# Patient Record
Sex: FEMALE | Race: WHITE | NOT HISPANIC OR LATINO | Employment: FULL TIME | URBAN - METROPOLITAN AREA
[De-identification: names, ages, dates, MRNs, and addresses within clinical notes are randomized per-mention and may not be internally consistent; named-entity substitution may affect disease eponyms.]

---

## 2017-07-27 ENCOUNTER — OFFICE VISIT (OUTPATIENT)
Dept: FAMILY MEDICINE | Facility: CLINIC | Age: 37
End: 2017-07-27
Payer: COMMERCIAL

## 2017-07-27 VITALS
HEART RATE: 92 BPM | WEIGHT: 221 LBS | SYSTOLIC BLOOD PRESSURE: 98 MMHG | TEMPERATURE: 98.7 F | HEIGHT: 66 IN | BODY MASS INDEX: 35.52 KG/M2 | DIASTOLIC BLOOD PRESSURE: 78 MMHG

## 2017-07-27 DIAGNOSIS — B02.9 HERPES ZOSTER WITHOUT COMPLICATION: ICD-10-CM

## 2017-07-27 DIAGNOSIS — G51.0 BELL'S PALSY: Primary | ICD-10-CM

## 2017-07-27 PROCEDURE — 99213 OFFICE O/P EST LOW 20 MIN: CPT | Performed by: NURSE PRACTITIONER

## 2017-07-27 RX ORDER — PREDNISONE 10 MG/1
TABLET ORAL
COMMUNITY
Start: 2017-07-23 | End: 2017-12-19

## 2017-07-27 RX ORDER — GABAPENTIN 100 MG/1
CAPSULE ORAL
COMMUNITY
Start: 2017-07-09 | End: 2017-12-19

## 2017-07-27 RX ORDER — VALACYCLOVIR HYDROCHLORIDE 1 G/1
TABLET, FILM COATED ORAL
COMMUNITY
Start: 2017-07-23 | End: 2017-12-19

## 2017-07-27 RX ORDER — ESCITALOPRAM OXALATE 20 MG/1
30 TABLET ORAL DAILY
Refills: 3 | COMMUNITY
Start: 2017-05-02 | End: 2020-07-07

## 2017-07-27 NOTE — MR AVS SNAPSHOT
After Visit Summary   7/27/2017    Lauren Callaway    MRN: [id number]           Patient Information     Date Of Birth          1980        Visit Information        Provider Department      7/27/2017 6:20 PM Raquel Scanlon APRN CNP [facility]        Care Instructions    1.  Complete prednisone and antiviral therapy  2.  Recommend taping right eye closed as you can during day and at night.  Use lubricating eye drops.  3.  Would expect some improvement in paralysis within the first weeks.  You have moderate symptoms, good chance of recovery  4.  There is a 7% risk of reoccurrence, follow up immediately if symptoms reoccur in the future           Follow-ups after your visit        Who to contact     If you have questions or need follow up information about today's clinic visit or your schedule please contact [facility] directly at [phone].  Normal or non-critical lab and imaging results will be communicated to you by MyChart, letter or phone within 4 business days after the clinic has received the results. If you do not hear from us within 7 days, please contact the clinic through [employer] or phone. If you have a critical or abnormal lab result, we will notify you by phone as soon as possible.  Submit refill requests through [employer] or call your pharmacy and they will forward the refill request to us. Please allow 3 business days for your refill to be completed.          Additional Information About Your Visit        MyChart Information     ACSIAN gives you secure access to your electronic health record. If you see a primary care provider, you can also send messages to your care team and make appointments. If you have questions, please call your primary care clinic.  If you do not have a primary care provider, please call [phone] and they will assist you.        Care EveryWhere ID     This is your Care EveryWhere ID. This could be used by other  "organizations to access your [place] medical records  [id number]        Your Vitals Were     Pulse Temperature Height BMI (Body Mass Index)          92 98.7  F (37.1  C) (Oral) 5' 6\" (1.676 m) 35.67 kg/m2         Blood Pressure from Last 3 Encounters:   07/27/17 98/78   07/06/14 121/68   06/26/14 118/82    Weight from Last 3 Encounters:   07/27/17 221 lb (100.2 kg)   07/05/14 215 lb 11.2 oz (97.8 kg)   06/26/14 214 lb (97.1 kg)              Today, you had the following     No orders found for display       Primary Care Provider Office Phone # Fax #    Sonal ALEX Burns [phone]       [facility] [address] [place] [address]        Equal Access to Services     CURTIS REAGAN : Hadii aad ku hadasho Soomaali, waaxda luqadaha, qaybta kaalmada adeegyada, waxay idiin hayaan maico hahnarano lin . So [facility] [phone].    ATENCIÓN: Si habla español, tiene a fall disposición servicios gratuitos de asistencia lingüística. Barbaravanessa al [phone].    We comply with applicable federal civil rights laws and [place] laws. We do not discriminate on the basis of race, color, national origin, age, disability sex, sexual orientation or gender identity.            Thank you!     Thank you for choosing [facility]  for your care. Our goal is always to provide you with excellent care. Hearing back from our patients is one way we can continue to improve our services. Please take a few minutes to complete the written survey that you may receive in the mail after your visit with us. Thank you!             Your Updated Medication List - Protect others around you: Learn how to safely use, store and throw away your medicines at www.disposemymeds.org.          This list is accurate as of: 7/27/17  7:23 PM.  Always use your most recent med list.                   Brand Name Dispense Instructions for use Diagnosis    acetaminophen 325 MG tablet    TYLENOL    100 tablet    " Take 2 tablets (650 mg) by mouth once as needed for mild pain    Cholelithiasis with obstruction       escitalopram 20 MG tablet    LEXAPRO     Take 20 mg by mouth daily        gabapentin 100 MG capsule    NEURONTIN          IBUPROFEN PO      Take 400 mg by mouth every 6 hours as needed for moderate pain        predniSONE 10 MG tablet    DELTASONE          valACYclovir 1000 mg tablet    VALTREX

## 2017-07-27 NOTE — PROGRESS NOTES
SUBJECTIVE:                                                    Lauren Callaway is a 37 year old female who presents to clinic today for the following health issues:        Seen in ER 7/22 in [place] for symptoms of right facial paralysis.  Underwent CT scan which was normal, pt reports neg UPT and had a blood draw.  She was diagnosed with bells palsey.  Started on prednisone, and antiviral medication.  No improvement in facial paralysis.  She is using lubricating eyedrops, not taping eye closed.      She is having headaches, eye feels strained.  Has tried ibuprofen with improvement.  unable to watch TV or be on computer due to eye strain.  Works in office job.  Part time job, has been able to get trough.      On prednisone taper, 5 days lest, startred on 30mg dose.  11 day taper. 2 days left antiviral, 7d course    Shingles diagnosed earlier this month.  Was out of town, seen in  and started on antiviral and gabapentin.  Rash is still present, no pain, resolving.      Patient Active Problem List   Diagnosis     BMI 31     Cholelithiasis with obstruction     Choledocholithiasis     Past Surgical History:   Procedure Laterality Date     LAPAROSCOPIC CHOLECYSTECTOMY  7/5/2014    Procedure: LAPAROSCOPIC CHOLECYSTECTOMY;  Surgeon: Pako Sherman MD;  Location: [facility] OR     NO HISTORY OF SURGERY         Social History   Substance Use Topics     Smoking status: Former Smoker     Smokeless tobacco: Never Used      Comment: quit 3 years ago     Alcohol use Yes      Comment: social     Family History   Problem Relation Age of Onset     CANCER Mother      CANCER Maternal Grandfather      CANCER Paternal Grandfather      skin cancer         Current Outpatient Prescriptions   Medication Sig Dispense Refill     acetaminophen (TYLENOL) 325 MG tablet Take 2 tablets (650 mg) by mouth once as needed for mild pain 100 tablet 0     IBUPROFEN PO Take 400 mg by mouth every 6 hours as needed for moderate pain       escitalopram  "(LEXAPRO) 20 MG tablet Take 20 mg by mouth daily  3     predniSONE (DELTASONE) 10 MG tablet        valACYclovir (VALTREX) 1000 mg tablet        gabapentin (NEURONTIN) 100 MG capsule          ROS:  Neuro, Integ as above, otherwise negative       OBJECTIVE:                                                    BP 98/78  Pulse 92  Temp 98.7  F (37.1  C) (Oral)  Ht 5' 6\" (1.676 m)  Wt 221 lb (100.2 kg)  BMI 35.67 kg/m2   GENERAL APPEARANCE: healthy, alert and no distress  EYES: Eyes grossly normal to inspection, PERRL and conjunctivae and sclerae normal.  Is able to close right eye with effort  HENT: ear canals and TM's normal and nose and mouth without ulcers or lesions  SKIN: shingles lesions to left side, inframmamary area, and back have healed and are fading  NEURO: does have some movement of forehead and right face with expression, able to completely close right eye.  Normal tone at rest, no apparent facial droop.  PSYCH: mentation appears normal and affect normal/bright        ASSESSMENT/PLAN:                                                    (G51.0) Bell's palsy  (primary encounter diagnosis)  Comment: moderate severity on house-brackmann scale  Plan: complete prednisone taper and antiviral therapy.  Discussed 70% of patients have full recovery and higher recovery rate with less severe symptoms.  Reviewed good eye care including taping shut at night and frequent use lubricating eye drops.  discussed headache likely related to eye stain, reviewed frequent breaks and ibuprofen as needed.      (B02.9) Herpes zoster without complication  Comment: healing  Plan: discussed vaccination however likely not coered by her insurance         See Patient Instructions    Raquel Scanlon, CNP  [facility]    Patient Instructions   1.  Complete prednisone and antiviral therapy  2.  Recommend taping right eye closed as you can during day and at night.  Use lubricating eye drops.  3.  Would expect some improvement in " paralysis within the first weeks.  You have moderate symptoms, good chance of recovery  4.  There is a 7% risk of reoccurrence, follow up immediately if symptoms reoccur in the future

## 2017-07-27 NOTE — NURSING NOTE
"Chief Complaint   Patient presents with     Hospital F/U       Initial BP 98/78  Pulse 92  Temp 98.7  F (37.1  C) (Oral)  Ht 5' 6\" (1.676 m)  Wt 221 lb (100.2 kg)  BMI 35.67 kg/m2 Estimated body mass index is 35.67 kg/(m^2) as calculated from the following:    Height as of this encounter: 5' 6\" (1.676 m).    Weight as of this encounter: 221 lb (100.2 kg).  Medication Reconciliation: complete   Jace Hays CMA   "

## 2017-07-28 NOTE — PATIENT INSTRUCTIONS
1.  Complete prednisone and antiviral therapy  2.  Recommend taping right eye closed as you can during day and at night.  Use lubricating eye drops.  3.  Would expect some improvement in paralysis within the first weeks.  You have moderate symptoms, good chance of recovery  4.  There is a 7% risk of reoccurrence, follow up immediately if symptoms reoccur in the future

## 2017-09-17 ENCOUNTER — HEALTH MAINTENANCE LETTER (OUTPATIENT)
Age: 37
End: 2017-09-17

## 2017-12-19 ENCOUNTER — OFFICE VISIT (OUTPATIENT)
Dept: FAMILY MEDICINE | Facility: CLINIC | Age: 37
End: 2017-12-19
Payer: COMMERCIAL

## 2017-12-19 VITALS
SYSTOLIC BLOOD PRESSURE: 96 MMHG | RESPIRATION RATE: 24 BRPM | DIASTOLIC BLOOD PRESSURE: 64 MMHG | BODY MASS INDEX: 35.91 KG/M2 | OXYGEN SATURATION: 96 % | HEART RATE: 103 BPM | WEIGHT: 222.5 LBS | TEMPERATURE: 99 F

## 2017-12-19 DIAGNOSIS — R07.0 THROAT PAIN: ICD-10-CM

## 2017-12-19 DIAGNOSIS — J11.1 INFLUENZA: Primary | ICD-10-CM

## 2017-12-19 LAB
DEPRECATED S PYO AG THROAT QL EIA: NORMAL
SPECIMEN SOURCE: NORMAL

## 2017-12-19 PROCEDURE — 87081 CULTURE SCREEN ONLY: CPT | Performed by: PHYSICIAN ASSISTANT

## 2017-12-19 PROCEDURE — 87880 STREP A ASSAY W/OPTIC: CPT | Performed by: PHYSICIAN ASSISTANT

## 2017-12-19 PROCEDURE — 99213 OFFICE O/P EST LOW 20 MIN: CPT | Performed by: PHYSICIAN ASSISTANT

## 2017-12-19 RX ORDER — OSELTAMIVIR PHOSPHATE 75 MG/1
75 CAPSULE ORAL 2 TIMES DAILY
Qty: 10 CAPSULE | Refills: 0 | Status: SHIPPED | OUTPATIENT
Start: 2017-12-19 | End: 2018-02-12

## 2017-12-19 ASSESSMENT — ENCOUNTER SYMPTOMS
SORE THROAT: 1
VOMITING: 0
NAUSEA: 1
SHORTNESS OF BREATH: 0
FOCAL WEAKNESS: 0
SINUS PAIN: 1
DIARRHEA: 0
MYALGIAS: 1
COUGH: 0
HEADACHES: 1
ABDOMINAL PAIN: 0
CHILLS: 1
FEVER: 1

## 2017-12-19 NOTE — PROGRESS NOTES
"  SUBJECTIVE:   Lauren Callaway is a 37 year old female who presents to clinic today for the following health issues:      RESPIRATORY SYMPTOMS      Duration: few dysa    Description  rhinorrhea, sore throat, facial pain/pressure, cough, fever, chills, headache, fatigue/malaise and nausea    Severity: moderate    Accompanying signs and symptoms: None    History (predisposing factors):  none    Precipitating or alleviating factors: None    Therapies tried and outcome:  rest and fluids        {additional problems for provider to add:387404}    Problem list and histories reviewed & adjusted, as indicated.  Additional history: {NONE - AS DOCUMENTED:943909::\"as documented\"}    {HIST REVIEW/ LINKS 2:835840}    Reviewed and updated as needed this visit by clinical staff     Reviewed and updated as needed this visit by Provider         {PROVIDER CHARTING PREFERENCE:273528}    "

## 2017-12-19 NOTE — PROGRESS NOTES
HPI    SUBJECTIVE:   Lauren Callaway is a 37 year old female who presents to clinic today for the following health issues:      RESPIRATORY SYMPTOMS      Duration: 2 days    Description  rhinorrhea, sore throat, facial pain/pressure, fever, chills, headache, myalgias, fatigue/malaise and nausea    Severity: moderate    Accompanying signs and symptoms: None    History (predisposing factors):  none    Precipitating or alleviating factors: None    Therapies tried and outcome:  rest and fluids    Unknown Tmax. Denies urinary sx, vomiting, diarrhea, abd pain, CP, or SOB.      Chart Review:  PHQ-9 SCORE 2/17/2014 6/16/2014   Total Score 7 3     No flowsheet data found.    Patient Active Problem List   Diagnosis     BMI 31     Cholelithiasis with obstruction     Choledocholithiasis     Past Surgical History:   Procedure Laterality Date     LAPAROSCOPIC CHOLECYSTECTOMY  7/5/2014    Procedure: LAPAROSCOPIC CHOLECYSTECTOMY;  Surgeon: Pako Sherman MD;  Location:  OR     NO HISTORY OF SURGERY       Family History   Problem Relation Age of Onset     CANCER Mother      CANCER Maternal Grandfather      CANCER Paternal Grandfather      skin cancer      Social History   Substance Use Topics     Smoking status: Former Smoker     Smokeless tobacco: Never Used      Comment: quit 3 years ago     Alcohol use Yes      Comment: social        Problem list, Medication list, Allergies, Medical/Social/Surg hx reviewed in [employer] updated as appropriate.      Review of Systems   Constitutional: Positive for chills, fever and malaise/fatigue.   HENT: Positive for sinus pain and sore throat.    Respiratory: Negative for cough and shortness of breath.    Cardiovascular: Negative for chest pain.   Gastrointestinal: Positive for nausea. Negative for abdominal pain, diarrhea and vomiting.   Musculoskeletal: Positive for myalgias.   Skin: Negative for rash.   Neurological: Positive for headaches. Negative for focal weakness.   All other systems  reviewed and are negative.        Physical Exam   Constitutional: She is oriented to person, place, and time and well-developed, well-nourished, and in no distress.   HENT:   Head: Normocephalic and atraumatic.   Right Ear: Tympanic membrane, external ear and ear canal normal.   Left Ear: Tympanic membrane, external ear and ear canal normal.   Nose: Mucosal edema present. No sinus tenderness.   Mouth/Throat: Uvula is midline and mucous membranes are normal. Posterior oropharyngeal erythema present. No oropharyngeal exudate, posterior oropharyngeal edema or tonsillar abscesses.   Cardiovascular: Normal rate, regular rhythm and normal heart sounds.    Pulmonary/Chest: Effort normal and breath sounds normal.   Musculoskeletal: Normal range of motion.   Neurological: She is alert and oriented to person, place, and time. Gait normal.   Skin: Skin is warm and dry.   Nursing note and vitals reviewed.    Vital Signs  BP 96/64  Pulse 103  Temp 99  F (37.2  C) (Oral)  Resp 24  Wt 222 lb 8 oz (100.9 kg)  SpO2 96%  BMI 35.91 kg/m2   Body mass index is 35.91 kg/(m^2).    Diagnostic Test Results:  Results for orders placed or performed in visit on 12/19/17 (from the past 24 hour(s))   Strep, Rapid Screen   Result Value Ref Range    Specimen Description Throat     Rapid Strep A Screen       NEGATIVE: No Group A streptococcal antigen detected by immunoassay, await culture report.       ASSESSMENT/PLAN:                                                        ICD-10-CM    1. Influenza J11.1 oseltamivir (TAMIFLU) 75 MG capsule   2. Throat pain R07.0 Strep, Rapid Screen     Beta strep group A culture   Strep negative, Lungs CTAB, Clinical influenza diagnosis. Rx Tamiflu, recommend rest/fluids/ibuprofen or Tylenol.    I have discussed any lab or imaging results, the patient's diagnosis, and my plan of treatment with the patient and/or family. Patient is aware to come back in if with worsening symptoms or if no relief despite  treatment plan.  Patient voiced understanding and had no further questions.       Follow Up: Return if symptoms worsen or fail to improve.    ILANA Freeman, PA-C  [facility]

## 2017-12-19 NOTE — MR AVS SNAPSHOT
After Visit Summary   12/19/2017    Lauren Callaway    MRN: [id number]           Patient Information     Date Of Birth          1980        Visit Information        Provider Department      12/19/2017 8:00 AM Sydnie Buchanan PA-C [facility][note 3]        Today's Diagnoses     Influenza    -  1    Throat pain           Follow-ups after your visit        Follow-up notes from your care team     Return if symptoms worsen or fail to improve.      Who to contact     If you have questions or need follow up information about today's clinic visit or your schedule please contact [facility] directly at [phone].  Normal or non-critical lab and imaging results will be communicated to you by MyChart, letter or phone within 4 business days after the clinic has received the results. If you do not hear from us within 7 days, please contact the clinic through [employer] or phone. If you have a critical or abnormal lab result, we will notify you by phone as soon as possible.  Submit refill requests through [employer] or call your pharmacy and they will forward the refill request to us. Please allow 3 business days for your refill to be completed.          Additional Information About Your Visit        MyChart Information     [employer] gives you secure access to your electronic health record. If you see a primary care provider, you can also send messages to your care team and make appointments. If you have questions, please call your primary care clinic.  If you do not have a primary care provider, please call [phone] and they will assist you.        Care EveryWhere ID     This is your Care EveryWhere ID. This could be used by other organizations to access your [place] medical records  [id number]        Your Vitals Were     Pulse Temperature Respirations Pulse Oximetry BMI (Body Mass Index)       103 99  F (37.2  C) (Oral) 24 96% 35.91 kg/m2        Blood Pressure from Last 3  Encounters:   12/19/17 96/64   07/27/17 98/78   07/06/14 121/68    Weight from Last 3 Encounters:   12/19/17 222 lb 8 oz (100.9 kg)   07/27/17 221 lb (100.2 kg)   07/05/14 215 lb 11.2 oz (97.8 kg)              We Performed the Following     Beta strep group A culture     Strep, Rapid Screen          Today's Medication Changes          These changes are accurate as of: 12/19/17  8:51 AM.  If you have any questions, ask your nurse or doctor.               Start taking these medicines.        Dose/Directions    oseltamivir 75 MG capsule   Commonly known as:  TAMIFLU   Used for:  Influenza   Started by:  Sydnie Buchanan PA-C        Dose:  75 mg   Take 1 capsule (75 mg) by mouth 2 times daily   Quantity:  10 capsule   Refills:  0            Where to get your medicines      These medications were sent to [facility] [address]  [address] [facility] [address]     Phone:  [phone]     oseltamivir 75 MG capsule                Primary Care Provider Office Phone # Fax #    Sonal Newberry, ALEX [facility] [phone] [address] [place] [address]        Equal Access to Services     CURTIS REAGAN AH: Hadii aad ku hadasho Soomaali, waaxda luqadaha, qaybta kaalmada adeegyada, vangie madrigal. So [facility] [phone].    ATENCIÓN: Si habla español, tiene a flal disposición servicios gratuitos de asistencia lingüística. Llame al [phone].    We comply with applicable federal civil rights laws and [place] laws. We do not discriminate on the basis of race, color, national origin, age, disability, sex, sexual orientation, or gender identity.            Thank you!     Thank you for choosing [facility]  for your care. Our goal is always to provide you with excellent care. Hearing back from our patients is one way we can continue to improve our services. Please take a few minutes to complete the written  survey that you may receive in the mail after your visit with us. Thank you!             Your Updated Medication List - Protect others around you: Learn how to safely use, store and throw away your medicines at www.disposemymeds.org.          This list is accurate as of: 12/19/17  8:51 AM.  Always use your most recent med list.                   Brand Name Dispense Instructions for use Diagnosis    acetaminophen 325 MG tablet    TYLENOL    100 tablet    Take 2 tablets (650 mg) by mouth once as needed for mild pain    Cholelithiasis with obstruction       escitalopram 20 MG tablet    LEXAPRO     Take 20 mg by mouth daily        IBUPROFEN PO      Take 400 mg by mouth every 6 hours as needed for moderate pain        oseltamivir 75 MG capsule    TAMIFLU    10 capsule    Take 1 capsule (75 mg) by mouth 2 times daily    Influenza

## 2017-12-20 LAB
BACTERIA SPEC CULT: NORMAL
SPECIMEN SOURCE: NORMAL

## 2018-02-12 ENCOUNTER — OFFICE VISIT (OUTPATIENT)
Dept: FAMILY MEDICINE | Facility: CLINIC | Age: 38
End: 2018-02-12
Payer: COMMERCIAL

## 2018-02-12 ENCOUNTER — TELEPHONE (OUTPATIENT)
Dept: FAMILY MEDICINE | Facility: CLINIC | Age: 38
End: 2018-02-12

## 2018-02-12 VITALS
TEMPERATURE: 98.4 F | OXYGEN SATURATION: 98 % | SYSTOLIC BLOOD PRESSURE: 108 MMHG | DIASTOLIC BLOOD PRESSURE: 73 MMHG | WEIGHT: 221 LBS | HEART RATE: 91 BPM | BODY MASS INDEX: 35.67 KG/M2 | RESPIRATION RATE: 18 BRPM

## 2018-02-12 DIAGNOSIS — Z01.84 IMMUNITY STATUS TESTING: ICD-10-CM

## 2018-02-12 DIAGNOSIS — Z11.1 SCREENING EXAMINATION FOR PULMONARY TUBERCULOSIS: ICD-10-CM

## 2018-02-12 DIAGNOSIS — J01.90 ACUTE SINUSITIS WITH SYMPTOMS > 10 DAYS: Primary | ICD-10-CM

## 2018-02-12 DIAGNOSIS — Z23 NEED FOR HEPATITIS B VACCINATION: ICD-10-CM

## 2018-02-12 LAB
MEV IGG SER QL IA: 1.2 AI (ref 0–0.8)
MUV IGG SER QL IA: 1.2 AI (ref 0–0.8)
RUBV IGG SERPL IA-ACNC: 11 IU/ML
VZV IGG SER QL IA: 7.6 AI (ref 0–0.8)

## 2018-02-12 PROCEDURE — 86762 RUBELLA ANTIBODY: CPT | Performed by: PHYSICIAN ASSISTANT

## 2018-02-12 PROCEDURE — 36415 COLL VENOUS BLD VENIPUNCTURE: CPT | Performed by: PHYSICIAN ASSISTANT

## 2018-02-12 PROCEDURE — 86480 TB TEST CELL IMMUN MEASURE: CPT | Performed by: PHYSICIAN ASSISTANT

## 2018-02-12 PROCEDURE — 90746 HEPB VACCINE 3 DOSE ADULT IM: CPT | Performed by: PHYSICIAN ASSISTANT

## 2018-02-12 PROCEDURE — 86735 MUMPS ANTIBODY: CPT | Performed by: PHYSICIAN ASSISTANT

## 2018-02-12 PROCEDURE — 99213 OFFICE O/P EST LOW 20 MIN: CPT | Mod: 25 | Performed by: PHYSICIAN ASSISTANT

## 2018-02-12 PROCEDURE — 86765 RUBEOLA ANTIBODY: CPT | Performed by: PHYSICIAN ASSISTANT

## 2018-02-12 PROCEDURE — 86787 VARICELLA-ZOSTER ANTIBODY: CPT | Performed by: PHYSICIAN ASSISTANT

## 2018-02-12 PROCEDURE — 90471 IMMUNIZATION ADMIN: CPT | Performed by: PHYSICIAN ASSISTANT

## 2018-02-12 RX ORDER — FLUTICASONE PROPIONATE 50 MCG
2 SPRAY, SUSPENSION (ML) NASAL DAILY
Qty: 16 G | Refills: 3 | Status: SHIPPED | OUTPATIENT
Start: 2018-02-12 | End: 2018-02-16

## 2018-02-12 NOTE — TELEPHONE ENCOUNTER
Reason for Call:  Medication or medication refill:    Do you use a [facility]?  Name of the pharmacy and phone number for the current request:  [id number] [place] [facility] [place], [place] - [address] [place] [address]    Name of the medication requested: flonase, augmentin    Other request: patient was seen today and the medication that was sent to her pharmacy patient states that the pharmacy is faxing over something for provider and that they will not fill prescription until they get that back    Can we leave a detailed message on this number? YES    Phone number patient can be reached at: Home number on file [phone] (home)    Best Time:     Call taken on 2/12/2018 at 11:21 AM by Alicia Davila

## 2018-02-12 NOTE — NURSING NOTE
Screening Questionnaire for Adult Immunization     Are you sick today?   Yes    Do you have allergies to medications, food or any vaccine?   No    Have you ever had a serious reaction after receiving a vaccination?   No    Do you have a long-term health problem with heart disease, lung disease,  asthma, kidney disease, diabetes, anemia, metabolic or blood disease?   No    Do you have cancer, leukemia, AIDS, or any immune system problem?   No    Do you take cortisone, prednisone, other steroids, or anticancer drugs, or  have you had any x-ray (radiation) treatments?   No    Have you had a seizure, brain, or other nervous system problem?   No    During the past year, have you received a transfusion of blood or blood       products, or been given a medicine called immune (gamma) globulin?   No    For women: Are you pregnant or is there a chance you could become         pregnant during the next month?   No    Have you received any vaccinations in the past 4 weeks?   No     Immunization questionnaire was positive for at least one answer.  Notified Plosser.      MNVFC doesn't apply on this patient      Per orders of Plosser, injection of Hep B given by Josefa Norman. Patient instructed to remain in clinic for 20 minutes afterwards, and to report any adverse reaction to me immediately.    Prior to injection verified patient identity using patient's name and date of birth.         Screening performed by Josefa Norman, CMA

## 2018-02-12 NOTE — PROGRESS NOTES
SUBJECTIVE:   Lauren Callaway is a 38 year old female who presents to clinic today for the following health issues:    RESPIRATORY SYMPTOMS      Duration: 2 weeks    Description  nasal congestion, sore throat, facial pain/pressure, both ears feel plugges and headache    Severity: moderate    Accompanying signs and symptoms: red nose (possible staph infection)    History (predisposing factors):  none    Therapies tried and outcome:  OTC cold medication, ibuprofen (lasyt dose last night)-helps a little     Fever last week for a few days, low grade.      Other:  Titer and immunization for school - see orders      Problem list and histories reviewed & adjusted, as indicated.  Additional history: as documented    Patient Active Problem List   Diagnosis     BMI 31     Cholelithiasis with obstruction     Choledocholithiasis     Past Surgical History:   Procedure Laterality Date     LAPAROSCOPIC CHOLECYSTECTOMY  7/5/2014    Procedure: LAPAROSCOPIC CHOLECYSTECTOMY;  Surgeon: Pako Sherman MD;  Location:  OR     NO HISTORY OF SURGERY         Social History   Substance Use Topics     Smoking status: Former Smoker     Smokeless tobacco: Never Used      Comment: quit 3 years ago     Alcohol use Yes      Comment: social     Family History   Problem Relation Age of Onset     CANCER Mother      CANCER Maternal Grandfather      CANCER Paternal Grandfather      skin cancer         Current Outpatient Prescriptions   Medication Sig Dispense Refill     BusPIRone HCl (BUSPAR PO) Take 15 mg by mouth daily       Amphetamine-Dextroamphetamine (AMPHETAMINE SALT COMBO PO)        amoxicillin-clavulanate (AUGMENTIN) 875-125 MG per tablet Take 1 tablet by mouth 2 times daily 20 tablet 0     fluticasone (FLONASE) 50 MCG/ACT spray Spray 2 sprays into both nostrils daily 16 g 3     escitalopram (LEXAPRO) 20 MG tablet Take 30 mg by mouth daily   3     IBUPROFEN PO Take 400 mg by mouth every 6 hours as needed for moderate pain        Allergies   Allergen Reactions     Nkda [No Known Drug Allergies]        Reviewed and updated as needed this visit by clinical staff  Tobacco  Allergies  Meds  Problems  Med Hx  Surg Hx  Fam Hx  Soc Hx        Reviewed and updated as needed this visit by Provider  Allergies  Meds  Problems         ROS:  Constitutional, HEENT, cardiovascular, pulmonary, GI, , musculoskeletal, neuro, skin, endocrine and psych systems are negative, except as otherwise noted.    OBJECTIVE:     /73 (BP Location: Left arm, Patient Position: Sitting, Cuff Size: Adult Large)  Pulse 91  Temp 98.4  F (36.9  C) (Oral)  Resp 18  Wt 221 lb (100.2 kg)  SpO2 98%  BMI 35.67 kg/m2  Body mass index is 35.67 kg/(m^2).  GENERAL: healthy, alert and no distress  EYES: Eyes grossly normal to inspection, PERRL and conjunctivae and sclerae normal  HENT: ear canals and TM's normal (but with increase clear mucous behind bilateral TMs, no erythema or bulging though), nose with increased erythema and some clear fluid externally; and mouth without ulcers or lesions; mild tenderness to palpation of sinuses.   NECK: no adenopathy, no asymmetry, masses, or scars and thyroid normal to palpation  RESP: lungs clear to auscultation - no rales, rhonchi or wheezes  CV: regular rate and rhythm, normal S1 S2, no S3 or S4, no murmur, click or rub, no peripheral edema and peripheral pulses strong  PSYCH: mentation appears normal, affect normal/bright    Diagnostic Test Results:  none     ASSESSMENT/PLAN:       ICD-10-CM    1. Acute sinusitis with symptoms > 10 days J01.90 amoxicillin-clavulanate (AUGMENTIN) 875-125 MG per tablet     fluticasone (FLONASE) 50 MCG/ACT spray   2. Immunity status testing Z01.84 Rubella Antibody IgG Quantitative     Rubeola Antibody IgG     Mumps Antibody IgG     Hepatitis B Surface Antibody     Varicella Zoster Virus Antibody IgG   3. Need for hepatitis B vaccination Z23 HEPATITIS B VACCINE, ADULT, IM     ADMIN 1st  VACCINE   4. Screening examination for pulmonary tuberculosis Z11.1 M Tuberculosis by [facility]       Patient Instructions   Encouraged mucinex/guafenisin, warm salt water gargles, cepacol spray, soothers/lozenges, sinus rinses (neilmed), flonase (2 sprays per nostril daily x 2 weeks), vitamin c, fluids and rest.  May alternate tylenol and NSAIDS (ibuprofen, advil, aleve type products) every 4-6 hours for the next few days as needed.    Prescription for augmentin (antibiotic) sent to pharmacy.  Return to clinic with any worsening or changes in symptoms.       Carey Davis PA-C  [facility]

## 2018-02-12 NOTE — MR AVS SNAPSHOT
After Visit Summary   2/12/2018    Lauren Callaway    MRN: [id number]           Patient Information     Date Of Birth          1980        Visit Information        Provider Department      2/12/2018 10:20 AM Carey Davis PA-C [facility]        Today's Diagnoses     Acute sinusitis with symptoms > 10 days    -  1    Immunity status testing        Need for hepatitis B vaccination        Screening examination for pulmonary tuberculosis          Care Instructions    Encouraged mucinex/guafenisin, warm salt water gargles, cepacol spray, soothers/lozenges, sinus rinses (neilmed), flonase (2 sprays per nostril daily x 2 weeks), vitamin c, fluids and rest.  May alternate tylenol and NSAIDS (ibuprofen, advil, aleve type products) every 4-6 hours for the next few days as needed.    Prescription for augmentin (antibiotic) sent to pharmacy.  Return to clinic with any worsening or changes in symptoms.           Follow-ups after your visit        Follow-up notes from your care team     Return if symptoms worsen or fail to improve.      Future tests that were ordered for you today     Open Future Orders        Priority Expected Expires Ordered    Hepatitis B Surface Antibody Routine  2/12/2019 2/12/2018            Who to contact     If you have questions or need follow up information about today's clinic visit or your schedule please contact [facility] directly at [phone].  Normal or non-critical lab and imaging results will be communicated to you by MyChart, letter or phone within 4 business days after the clinic has received the results. If you do not hear from us within 7 days, please contact the clinic through MyChart or phone. If you have a critical or abnormal lab result, we will notify you by phone as soon as possible.  Submit refill requests through [employer] or call your pharmacy and they will forward the refill request to us. Please allow 3 business days for  your refill to be completed.          Additional Information About Your Visit        Go [employer] Information     [employer] gives you secure access to your electronic health record. If you see a primary care provider, you can also send messages to your care team and make appointments. If you have questions, please call your primary care clinic.  If you do not have a primary care provider, please call [phone] and they will assist you.        Care EveryWhere ID     This is your Care EveryWhere ID. This could be used by other organizations to access your [facility] medical records  [id number]        Your Vitals Were     Pulse Temperature Respirations Pulse Oximetry BMI (Body Mass Index)       91 98.4  F (36.9  C) (Oral) 18 98% 35.67 kg/m2        Blood Pressure from Last 3 Encounters:   02/12/18 108/73   12/19/17 96/64   07/27/17 98/78    Weight from Last 3 Encounters:   02/12/18 221 lb (100.2 kg)   12/19/17 222 lb 8 oz (100.9 kg)   07/27/17 221 lb (100.2 kg)              We Performed the Following     HEPATITIS B VACCINE, ADULT, IM     M Tuberculosis by Quantiferon     Mumps Antibody IgG     Rubella Antibody IgG Quantitative     Rubeola Antibody IgG     Varicella Zoster Virus Antibody IgG          Today's Medication Changes          These changes are accurate as of 2/12/18 10:36 AM.  If you have any questions, ask your nurse or doctor.               Start taking these medicines.        Dose/Directions    amoxicillin-clavulanate 875-125 MG per tablet   Commonly known as:  AUGMENTIN   Used for:  Acute sinusitis with symptoms > 10 days   Started by:  Carey Davis PA-C        Dose:  1 tablet   Take 1 tablet by mouth 2 times daily   Quantity:  20 tablet   Refills:  0       fluticasone 50 MCG/ACT spray   Commonly known as:  FLONASE   Used for:  Acute sinusitis with symptoms > 10 days   Started by:  Carey Davis PA-C        Dose:  2 spray   Spray 2 sprays into both nostrils daily   Quantity:  16 g    Refills:  3         Stop taking these medicines if you haven't already. Please contact your care team if you have questions.     acetaminophen 325 MG tablet   Commonly known as:  TYLENOL   Stopped by:  Carey Davis PA-C           oseltamivir 75 MG capsule   Commonly known as:  TAMIFLU   Stopped by:  Carey Davis PA-C                Where to get your medicines      These medications were sent to [facility] [address] [place] [facility] [address]  [address] [address]     Phone:  [phone]     amoxicillin-clavulanate 875-125 MG per tablet    fluticasone 50 MCG/ACT spray                Primary Care Provider Office Phone # Fax #    Sonal BRAGG ALEX [facility] [phone] [address] [place] [address]        Equal Access to Services     CURTIS REAGAN : Hadii jayme caseyo Sobal, waaxda luqadaha, qaybta kaalmada adenateyafélix, vangie lin . So [facility] [phone].    ATENCIÓN: Si habla español, tiene a fall disposición servicios gratuitos de asistencia lingüística. Chey al [phone].    We comply with applicable federal civil rights laws and [place] laws. We do not discriminate on the basis of race, color, national origin, age, disability, sex, sexual orientation, or gender identity.            Thank you!     Thank you for choosing [facility] your [facility]. Our goal is always to provide you with excellent care. Hearing back from our patients is one way we can continue to improve our services. Please take a few minutes to complete the written survey that you may receive in the mail after your visit with us. Thank you!             Your Updated Medication List - Protect others around you: Learn how to safely use, store and throw away your medicines at www.disposemymeds.org.          This list is accurate as of 2/12/18 10:36 AM.  Always use your most recent med list.                    Brand Name Dispense Instructions for use Diagnosis    amoxicillin-clavulanate 875-125 MG per tablet    AUGMENTIN    20 tablet    Take 1 tablet by mouth 2 times daily    Acute sinusitis with symptoms > 10 days       AMPHETAMINE SALT COMBO PO           BUSPAR PO      Take 15 mg by mouth daily        escitalopram 20 MG tablet    LEXAPRO     Take 30 mg by mouth daily        fluticasone 50 MCG/ACT spray    FLONASE    16 g    Spray 2 sprays into both nostrils daily    Acute sinusitis with symptoms > 10 days       IBUPROFEN PO      Take 400 mg by mouth every 6 hours as needed for moderate pain

## 2018-02-12 NOTE — PATIENT INSTRUCTIONS
Encouraged mucinex/guafenisin, warm salt water gargles, cepacol spray, soothers/lozenges, sinus rinses (neilmed), flonase (2 sprays per nostril daily x 2 weeks), vitamin c, fluids and rest.  May alternate tylenol and NSAIDS (ibuprofen, advil, aleve type products) every 4-6 hours for the next few days as needed.    Prescription for augmentin (antibiotic) sent to pharmacy.  Return to clinic with any worsening or changes in symptoms.

## 2018-02-13 DIAGNOSIS — J01.90 ACUTE SINUSITIS WITH SYMPTOMS > 10 DAYS: ICD-10-CM

## 2018-02-13 LAB
M TB TUBERC IFN-G BLD QL: NEGATIVE
M TB TUBERC IFN-G/MITOGEN IGNF BLD: 0.14 IU/ML

## 2018-02-13 NOTE — TELEPHONE ENCOUNTER
Alternative requested: drug not covered, formulary meds are: Mometasone flunisolide or triamcinlone

## 2018-02-13 NOTE — TELEPHONE ENCOUNTER
"Requested Prescriptions   Pending Prescriptions Disp Refills     fluticasone (FLONASE) 50 MCG/ACT spray  Last Written Prescription Date:  2/12/2018  Last Fill Quantity: 16g,  # refills: 3   Last Office Visit: 2/12/2018   Future Office Visit:      16 g 3     Sig: Spray 2 sprays into both nostrils daily    Inhaled Steroids Protocol Failed    2/13/2018 10:37 AM       Failed - Recent or future visit with authorizing provider's specialty    Patient had office visit in the last year or has a visit in the next 30 days with authorizing provider.  See \"Patient Info\" tab in inbasket, or \"Choose Columns\" in Meds & Orders section of the refill encounter.          Passed - Patient is age 12 or older          "

## 2018-02-14 RX ORDER — FLUTICASONE PROPIONATE 50 MCG
2 SPRAY, SUSPENSION (ML) NASAL DAILY
Qty: 0.01 G | Refills: 0 | OUTPATIENT
Start: 2018-02-14

## 2018-02-14 NOTE — TELEPHONE ENCOUNTER
Duplicate    Refused Prescriptions:                       Disp   Refills    fluticasone (FLONASE) 50 MCG/ACT spray     0.01 g 0        Sig: Spray 2 sprays into both nostrils daily  Refused By: MARY LUTZ  Reason for Refusal: Duplicate      Closing encounter - no further actions needed at this time    Mary Lutz RN

## 2018-02-15 NOTE — PROGRESS NOTES
"Annamarie Aguilar  Your attached labs suggest immunity to MMR and chickenpox.  Your TB testing is negative as well.    Please contact the office with any questions or concerns.    Moody,  Carey \"Freddie\" VAISHALI Davis  "

## 2018-02-16 DIAGNOSIS — J01.90 ACUTE SINUSITIS WITH SYMPTOMS > 10 DAYS: ICD-10-CM

## 2018-02-16 NOTE — TELEPHONE ENCOUNTER
"Requested Prescriptions   Pending Prescriptions Disp Refills     fluticasone (FLONASE) 50 MCG/ACT spray  PHARMACY REQUESTING ALTERNATIVE BECAUSE NOT COVERED.  Last Written Prescription Date:  2/12/18  Last Fill Quantity: 16 G,  # refills: 3   Last office visit: 2/12/2018 with prescribing provider:  2/12/18   Future Office Visit:     16 g 3     Sig: Spray 2 sprays into both nostrils daily    Inhaled Steroids Protocol Failed    2/16/2018  4:20 PM       Failed - Recent or future visit with authorizing provider's specialty    Patient had office visit in the last year or has a visit in the next 30 days with authorizing provider.  See \"Patient Info\" tab in inbasket, or \"Choose Columns\" in Meds & Orders section of the refill encounter.            Passed - Patient is age 12 or older        PHARMACY LISTS FORMULARY MEDS: MOMETASONE, FLUNISOLIDE, OR TRIAMCINOLONE.  "

## 2018-02-19 RX ORDER — FLUTICASONE PROPIONATE 50 MCG
2 SPRAY, SUSPENSION (ML) NASAL DAILY
Qty: 16 G | Refills: 3 | Status: SHIPPED | OUTPATIENT
Start: 2018-02-19 | End: 2021-04-13

## 2018-02-19 NOTE — TELEPHONE ENCOUNTER
Routing refill request to provider for review/approval because:  Diagnosis not on the [facility] refill protocol Acute sinusitis with symptoms > 10 days [J01.90]  - Primary       Thank you,  Venkata Tran RN

## 2019-02-22 ENCOUNTER — MEDICAL CORRESPONDENCE (OUTPATIENT)
Dept: HEALTH INFORMATION MANAGEMENT | Facility: CLINIC | Age: 39
End: 2019-02-22

## 2019-03-14 ENCOUNTER — TELEPHONE (OUTPATIENT)
Dept: FAMILY MEDICINE | Facility: CLINIC | Age: 39
End: 2019-03-14

## 2019-03-14 NOTE — TELEPHONE ENCOUNTER
Patient has lab only appt here TOMORROW (Friday 3/15), no orders in chart.  Please place FUTURE orders in Epic if appropriate.    Thanks,   Robina vidales

## 2019-03-14 NOTE — TELEPHONE ENCOUNTER
Please call Lauren.  I do not know why she is coming in for lab only or what labs she is looking for.

## 2019-03-14 NOTE — TELEPHONE ENCOUNTER
Phone call to patient     Patient states she has a written outside order for multiple labs to be drawn     Ordering provider is from [facility] - Toshia Dietrich PAC -     Patient will bring written order with to the appointment     Tessa Kelly, Registered Nurse   [facility]

## 2019-03-15 DIAGNOSIS — Z79.899 NEED FOR PROPHYLACTIC CHEMOTHERAPY: Primary | ICD-10-CM

## 2019-03-15 DIAGNOSIS — R53.83 FATIGUE: ICD-10-CM

## 2019-03-15 LAB
ALT SERPL W P-5'-P-CCNC: 40 U/L (ref 0–50)
ANION GAP SERPL CALCULATED.3IONS-SCNC: 8 MMOL/L (ref 3–14)
AST SERPL W P-5'-P-CCNC: 24 U/L (ref 0–45)
BASOPHILS # BLD AUTO: 0 10E9/L (ref 0–0.2)
BASOPHILS NFR BLD AUTO: 0 %
BUN SERPL-MCNC: 15 MG/DL (ref 7–30)
CALCIUM SERPL-MCNC: 8.9 MG/DL (ref 8.5–10.1)
CHLORIDE SERPL-SCNC: 107 MMOL/L (ref 94–109)
CHOLEST SERPL-MCNC: 179 MG/DL
CO2 SERPL-SCNC: 24 MMOL/L (ref 20–32)
CREAT SERPL-MCNC: 0.73 MG/DL (ref 0.52–1.04)
DIFFERENTIAL METHOD BLD: NORMAL
EOSINOPHIL # BLD AUTO: 0 10E9/L (ref 0–0.7)
EOSINOPHIL NFR BLD AUTO: 0 %
ERYTHROCYTE [DISTWIDTH] IN BLOOD BY AUTOMATED COUNT: 12.3 % (ref 10–15)
FERRITIN SERPL-MCNC: 109 NG/ML (ref 12–150)
GFR SERPL CREATININE-BSD FRML MDRD: >90 ML/MIN/{1.73_M2}
GLUCOSE SERPL-MCNC: 90 MG/DL (ref 70–99)
GLUCOSE SERPL-MCNC: 93 MG/DL (ref 70–99)
HBA1C MFR BLD: 5.2 % (ref 0–5.6)
HCT VFR BLD AUTO: 40 % (ref 35–47)
HDLC SERPL-MCNC: 40 MG/DL
HGB BLD-MCNC: 13.4 G/DL (ref 11.7–15.7)
IRON SERPL-MCNC: 77 UG/DL (ref 35–180)
LDLC SERPL CALC-MCNC: 118 MG/DL
LYMPHOCYTES # BLD AUTO: 2.1 10E9/L (ref 0.8–5.3)
LYMPHOCYTES NFR BLD AUTO: 28.5 %
MCH RBC QN AUTO: 29 PG (ref 26.5–33)
MCHC RBC AUTO-ENTMCNC: 33.5 G/DL (ref 31.5–36.5)
MCV RBC AUTO: 87 FL (ref 78–100)
MONOCYTES # BLD AUTO: 0.4 10E9/L (ref 0–1.3)
MONOCYTES NFR BLD AUTO: 5.3 %
NEUTROPHILS # BLD AUTO: 4.9 10E9/L (ref 1.6–8.3)
NEUTROPHILS NFR BLD AUTO: 66.2 %
NONHDLC SERPL-MCNC: 139 MG/DL
PLATELET # BLD AUTO: 248 10E9/L (ref 150–450)
POTASSIUM SERPL-SCNC: 4.1 MMOL/L (ref 3.4–5.3)
RBC # BLD AUTO: 4.62 10E12/L (ref 3.8–5.2)
SODIUM SERPL-SCNC: 139 MMOL/L (ref 133–144)
T3 SERPL-MCNC: 100 NG/DL (ref 60–181)
T3FREE SERPL-MCNC: 2.4 PG/ML (ref 2.3–4.2)
T4 FREE SERPL-MCNC: 1 NG/DL (ref 0.76–1.46)
T4 SERPL-MCNC: 9.3 UG/DL (ref 4.5–13.9)
TRIGL SERPL-MCNC: 104 MG/DL
TSH SERPL DL<=0.005 MIU/L-ACNC: 2.9 MU/L (ref 0.4–4)
VIT B12 SERPL-MCNC: 678 PG/ML (ref 193–986)
WBC # BLD AUTO: 7.4 10E9/L (ref 4–11)

## 2019-03-15 PROCEDURE — 84460 ALANINE AMINO (ALT) (SGPT): CPT | Performed by: FAMILY MEDICINE

## 2019-03-15 PROCEDURE — 84436 ASSAY OF TOTAL THYROXINE: CPT | Performed by: FAMILY MEDICINE

## 2019-03-15 PROCEDURE — 83090 ASSAY OF HOMOCYSTEINE: CPT | Performed by: FAMILY MEDICINE

## 2019-03-15 PROCEDURE — 82728 ASSAY OF FERRITIN: CPT | Performed by: FAMILY MEDICINE

## 2019-03-15 PROCEDURE — 84443 ASSAY THYROID STIM HORMONE: CPT | Performed by: FAMILY MEDICINE

## 2019-03-15 PROCEDURE — 82947 ASSAY GLUCOSE BLOOD QUANT: CPT | Mod: 59 | Performed by: FAMILY MEDICINE

## 2019-03-15 PROCEDURE — 80048 BASIC METABOLIC PNL TOTAL CA: CPT | Performed by: FAMILY MEDICINE

## 2019-03-15 PROCEDURE — 99000 SPECIMEN HANDLING OFFICE-LAB: CPT | Performed by: FAMILY MEDICINE

## 2019-03-15 PROCEDURE — 83921 ORGANIC ACID SINGLE QUANT: CPT | Mod: 90 | Performed by: FAMILY MEDICINE

## 2019-03-15 PROCEDURE — 36415 COLL VENOUS BLD VENIPUNCTURE: CPT | Performed by: FAMILY MEDICINE

## 2019-03-15 PROCEDURE — 83540 ASSAY OF IRON: CPT | Performed by: FAMILY MEDICINE

## 2019-03-15 PROCEDURE — 80061 LIPID PANEL: CPT | Performed by: FAMILY MEDICINE

## 2019-03-15 PROCEDURE — 82306 VITAMIN D 25 HYDROXY: CPT | Performed by: FAMILY MEDICINE

## 2019-03-15 PROCEDURE — 83036 HEMOGLOBIN GLYCOSYLATED A1C: CPT | Performed by: FAMILY MEDICINE

## 2019-03-15 PROCEDURE — 84450 TRANSFERASE (AST) (SGOT): CPT | Performed by: FAMILY MEDICINE

## 2019-03-15 PROCEDURE — 82607 VITAMIN B-12: CPT | Performed by: FAMILY MEDICINE

## 2019-03-15 PROCEDURE — 84480 ASSAY TRIIODOTHYRONINE (T3): CPT | Performed by: FAMILY MEDICINE

## 2019-03-15 PROCEDURE — 86376 MICROSOMAL ANTIBODY EACH: CPT | Performed by: FAMILY MEDICINE

## 2019-03-15 PROCEDURE — 85025 COMPLETE CBC W/AUTO DIFF WBC: CPT | Performed by: FAMILY MEDICINE

## 2019-03-17 LAB — METHYLMALONATE SERPL-SCNC: 0.11 UMOL/L (ref 0–0.4)

## 2019-03-18 LAB — THYROPEROXIDASE AB SERPL-ACNC: 13 IU/ML

## 2019-03-20 LAB
DEPRECATED CALCIDIOL+CALCIFEROL SERPL-MC: <72 UG/L (ref 20–75)
HCYS SERPL-SCNC: 7.7 UMOL/L (ref 4–12)
VITAMIN D2 SERPL-MCNC: <5 UG/L
VITAMIN D3 SERPL-MCNC: 67 UG/L

## 2019-11-08 ENCOUNTER — HEALTH MAINTENANCE LETTER (OUTPATIENT)
Age: 39
End: 2019-11-08

## 2020-02-23 ENCOUNTER — HEALTH MAINTENANCE LETTER (OUTPATIENT)
Age: 40
End: 2020-02-23

## 2020-07-07 ENCOUNTER — OFFICE VISIT (OUTPATIENT)
Dept: FAMILY MEDICINE | Facility: CLINIC | Age: 40
End: 2020-07-07
Payer: COMMERCIAL

## 2020-07-07 VITALS
RESPIRATION RATE: 16 BRPM | HEIGHT: 66 IN | DIASTOLIC BLOOD PRESSURE: 80 MMHG | TEMPERATURE: 97.1 F | HEART RATE: 93 BPM | OXYGEN SATURATION: 96 % | SYSTOLIC BLOOD PRESSURE: 126 MMHG | BODY MASS INDEX: 36.32 KG/M2 | WEIGHT: 226 LBS

## 2020-07-07 DIAGNOSIS — Z12.4 SCREENING FOR CERVICAL CANCER: ICD-10-CM

## 2020-07-07 DIAGNOSIS — Z13.1 SCREENING FOR DIABETES MELLITUS: ICD-10-CM

## 2020-07-07 DIAGNOSIS — Z13.220 SCREENING FOR HYPERLIPIDEMIA: ICD-10-CM

## 2020-07-07 DIAGNOSIS — E66.01 MORBID OBESITY (H): ICD-10-CM

## 2020-07-07 DIAGNOSIS — Z00.00 ROUTINE GENERAL MEDICAL EXAMINATION AT A HEALTH CARE FACILITY: Primary | ICD-10-CM

## 2020-07-07 DIAGNOSIS — Z13.0 SCREENING FOR IRON DEFICIENCY ANEMIA: ICD-10-CM

## 2020-07-07 DIAGNOSIS — Z97.5 IUD (INTRAUTERINE DEVICE) IN PLACE: ICD-10-CM

## 2020-07-07 LAB
CHOLEST SERPL-MCNC: 197 MG/DL
GLUCOSE SERPL-MCNC: 98 MG/DL (ref 70–99)
HDLC SERPL-MCNC: 37 MG/DL
HGB BLD-MCNC: 13.8 G/DL (ref 11.7–15.7)
LDLC SERPL CALC-MCNC: 116 MG/DL
NONHDLC SERPL-MCNC: 160 MG/DL
TRIGL SERPL-MCNC: 219 MG/DL

## 2020-07-07 PROCEDURE — 82947 ASSAY GLUCOSE BLOOD QUANT: CPT | Performed by: NURSE PRACTITIONER

## 2020-07-07 PROCEDURE — 36415 COLL VENOUS BLD VENIPUNCTURE: CPT | Performed by: NURSE PRACTITIONER

## 2020-07-07 PROCEDURE — G0145 SCR C/V CYTO,THINLAYER,RESCR: HCPCS | Performed by: NURSE PRACTITIONER

## 2020-07-07 PROCEDURE — 85018 HEMOGLOBIN: CPT | Performed by: NURSE PRACTITIONER

## 2020-07-07 PROCEDURE — 87624 HPV HI-RISK TYP POOLED RSLT: CPT | Performed by: NURSE PRACTITIONER

## 2020-07-07 PROCEDURE — 99396 PREV VISIT EST AGE 40-64: CPT | Performed by: NURSE PRACTITIONER

## 2020-07-07 PROCEDURE — 80061 LIPID PANEL: CPT | Performed by: NURSE PRACTITIONER

## 2020-07-07 RX ORDER — RISPERIDONE 1 MG/1
1 TABLET ORAL 2 TIMES DAILY
COMMUNITY
Start: 2020-07-07

## 2020-07-07 RX ORDER — VENLAFAXINE HYDROCHLORIDE 150 MG/1
CAPSULE, EXTENDED RELEASE ORAL
COMMUNITY
Start: 2020-05-27

## 2020-07-07 RX ORDER — LAMOTRIGINE 150 MG/1
TABLET ORAL
COMMUNITY
Start: 2020-05-27

## 2020-07-07 ASSESSMENT — PATIENT HEALTH QUESTIONNAIRE - PHQ9: SUM OF ALL RESPONSES TO PHQ QUESTIONS 1-9: 18

## 2020-07-07 ASSESSMENT — MIFFLIN-ST. JEOR: SCORE: 1711.88

## 2020-07-07 NOTE — PROGRESS NOTES
SUBJECTIVE:   CC: Lauren Callaway is an 40 year old woman who presents for preventive health visit.     Healthy Habits:    Do you get at least three servings of calcium containing foods daily (dairy, green leafy vegetables, etc.)? yes    Amount of exercise or daily activities, outside of work: 1 hour(s) per day    Problems taking medications regularly No    Medication side effects: No    Have you had an eye exam in the past two years? yes    Do you see a dentist twice per year? yes    Do you have sleep apnea, excessive snoring or daytime drowsiness?no        Today's PHQ-2 Score:   PHQ-2 ( 1999 Pf[employer]) 7/7/2020 2/12/2018   Q1: Little interest or pleasure in doing things 1 0   Q2: Feeling down, depressed or hopeless 3 0   PHQ-2 Score 4 0       Abuse: Current or Past(Physical, Sexual or Emotional)- No  Do you feel safe in your environment? Yes    Social History     Tobacco Use     Smoking status: Former Smoker     Smokeless tobacco: Never Used     Tobacco comment: quit 3 years ago   Substance Use Topics     Alcohol use: Yes     Comment: social     If you drink alcohol do you typically have >3 drinks per day or >7 drinks per week? No                     Reviewed orders with patient.  Reviewed health maintenance and updated orders accordingly -     Mammogram Screening: Patient under age 50, mutual decision reflected in health maintenance.      Pertinent mammograms are reviewed under the imaging tab.  History of abnormal Pap smear:   Last 3 Pap and HPV Results:   PAP / HPV 6/16/2014   PAP NIL     PAP / HPV 6/16/2014   PAP NIL   mirena IUD in place.  Placed approximately 5 years ago.  She needs a referral for a switch out.    Mental health:  Managed by psychiatry.    Reviewed and updated as needed this visit by clinical staff  Tobacco  Allergies  Meds  Med Hx  Surg Hx  Fam Hx  Soc Hx        Reviewed and updated as needed this visit by Provider            ROS:  CONSTITUTIONAL: NEGATIVE for fever, chills, change in  "weight  INTEGUMENTARU/SKIN: NEGATIVE for worrisome rashes, moles or lesions  EYES: NEGATIVE for vision changes or irritation  ENT: NEGATIVE for ear, mouth and throat problems  RESP: NEGATIVE for significant cough or SOB  BREAST: NEGATIVE for masses, tenderness or discharge  CV: NEGATIVE for chest pain, palpitations or peripheral edema  GI: NEGATIVE for nausea, abdominal pain, heartburn, or change in bowel habits  : NEGATIVE for unusual urinary or vaginal symptoms. Periods are regular.  MUSCULOSKELETAL: NEGATIVE for significant arthralgias or myalgia  NEURO: NEGATIVE for weakness, dizziness or paresthesias  ENDOCRINE: NEGATIVE for temperature intolerance, skin/hair changes  PSYCHIATRIC: NEGATIVE for changes in mood or affect    OBJECTIVE:   /80 (BP Location: Right arm, Patient Position: Sitting, Cuff Size: Adult Large)   Pulse 93   Temp 97.1  F (36.2  C) (Tympanic)   Resp 16   Ht 1.676 m (5' 6\")   Wt 102.5 kg (226 lb)   SpO2 96%   BMI 36.48 kg/m    EXAM:  GENERAL: healthy, alert and no distress  EYES: Eyes grossly normal to inspection, PERRL and conjunctivae and sclerae normal  HENT: ear canals and TM's normal, nose and mouth without ulcers or lesions  NECK: no adenopathy, no asymmetry, masses, or scars and thyroid normal to palpation  RESP: lungs clear to auscultation - no rales, rhonchi or wheezes  BREAST: normal without masses, tenderness or nipple discharge and no palpable axillary masses or adenopathy  CV: regular rate and rhythm, normal S1 S2, no S3 or S4, no murmur, click or rub, no peripheral edema and peripheral pulses strong  ABDOMEN: soft, nontender, no hepatosplenomegaly, no masses and bowel sounds normal   (female): normal female external genitalia, normal urethral meatus, vaginal mucosa pink, moist, well rugated, and normal cervix/adnexa/uterus without masses or discharge.  IUD strings visible.  MS: no gross musculoskeletal defects noted, no edema  SKIN: no suspicious lesions or " "rashes  NEURO: Normal strength and tone, mentation intact and speech normal  PSYCH: mentation appears normal, affect normal/bright    Diagnostic Test Results:  Labs reviewed in Epic  Labs ordered, results pending    ASSESSMENT/PLAN:   (Z00.00) Routine general medical examination at a health care facility  (primary encounter diagnosis)  Comment:   Plan: Pap imaged thin layer screen with HPV -         recommended age 30 - 65, HPV High Risk Types         DNA Cervical, OB/GYN REFERRAL, Lipid panel         reflex to direct LDL Fasting, Hemoglobin,         Glucose            (E66.01) Morbid obesity (H)  Comment:   Plan: See below    (Z97.5) IUD (intrauterine device) in place, mirena  Comment:   Plan: She will follow-up with OB/GYN for an IUD switch out.    (Z12.4) Screening for cervical cancer  Comment:   Plan: Done today    (Z13.1) Screening for diabetes mellitus  Comment:   Plan: Glucose        Done today    (Z13.0) Screening for iron deficiency anemia  Comment:   Plan: Hemoglobin        Done today    (Z13.220) Screening for hyperlipidemia  Comment:   Plan: Lipid panel reflex to direct LDL Fasting        Done today      COUNSELING:   Reviewed preventive health counseling, as reflected in patient instructions    Estimated body mass index is 36.48 kg/m  as calculated from the following:    Height as of this encounter: 1.676 m (5' 6\").    Weight as of this encounter: 102.5 kg (226 lb).    Weight management plan: Discussed healthy diet and exercise guidelines     reports that she has quit smoking. She has never used smokeless tobacco.      Counseling Resources:  ATP IV Guidelines  Pooled Cohorts Equation Calculator  Breast Cancer Risk Calculator  FRAX Risk Assessment  ICSI Preventive Guidelines  Dietary Guidelines for Americans, 2010  USDA's MyPlate  ASA Prophylaxis  Lung CA Screening    ALEX Koo [facility]  "

## 2020-07-08 NOTE — RESULT ENCOUNTER NOTE
Annamarie Aguilar,     This note is to let you know the results of your recent lab studies.    Your blood count/hemoglobin and blood sugar/glucose are normal.    Your cholesterol levels are mildly elevated. I recommend a diet low in fat and cholesterol as well as regular aerobic activity. I would like to recheck your cholesterol in one year.    Let me know if you have any questions.    Sonal JOHNSON CNP

## 2020-07-09 LAB
COPATH REPORT: NORMAL
PAP: NORMAL

## 2020-07-14 LAB
FINAL DIAGNOSIS: NORMAL
HPV HR 12 DNA CVX QL NAA+PROBE: NEGATIVE
HPV16 DNA SPEC QL NAA+PROBE: NEGATIVE
HPV18 DNA SPEC QL NAA+PROBE: NEGATIVE
SPECIMEN DESCRIPTION: NORMAL
SPECIMEN SOURCE CVX/VAG CYTO: NORMAL

## 2020-12-06 ENCOUNTER — HEALTH MAINTENANCE LETTER (OUTPATIENT)
Age: 40
End: 2020-12-06

## 2021-03-09 DIAGNOSIS — F41.9 ANXIETY DISORDER, UNSPECIFIED TYPE: ICD-10-CM

## 2021-03-09 DIAGNOSIS — F34.1 DYSTHYMIC DISORDER: Primary | ICD-10-CM

## 2021-03-09 DIAGNOSIS — F31.9 BIPOLAR DISORDER, UNSPECIFIED (H): ICD-10-CM

## 2021-03-09 DIAGNOSIS — F43.10 POSTTRAUMATIC STRESS DISORDER: ICD-10-CM

## 2021-03-09 LAB
ALBUMIN SERPL-MCNC: 3.7 G/DL (ref 3.4–5)
ALP SERPL-CCNC: 112 U/L (ref 40–150)
ALT SERPL W P-5'-P-CCNC: 65 U/L (ref 0–50)
ANION GAP SERPL CALCULATED.3IONS-SCNC: 4 MMOL/L (ref 3–14)
AST SERPL W P-5'-P-CCNC: 31 U/L (ref 0–45)
BASOPHILS NFR BLD AUTO: 0.8 %
BILIRUB DIRECT SERPL-MCNC: 0.1 MG/DL (ref 0–0.2)
BILIRUB SERPL-MCNC: 0.4 MG/DL (ref 0.2–1.3)
BUN SERPL-MCNC: 15 MG/DL (ref 7–30)
CALCIUM SERPL-MCNC: 9.3 MG/DL (ref 8.5–10.1)
CHLORIDE SERPL-SCNC: 107 MMOL/L (ref 94–109)
CHOLEST SERPL-MCNC: 195 MG/DL
CO2 SERPL-SCNC: 27 MMOL/L (ref 20–32)
CREAT SERPL-MCNC: 0.9 MG/DL (ref 0.52–1.04)
DEPRECATED CALCIDIOL+CALCIFEROL SERPL-MC: 35 UG/L (ref 20–75)
DIFFERENTIAL METHOD BLD: NORMAL
EOSINOPHIL NFR BLD AUTO: 2.8 %
ERYTHROCYTE [DISTWIDTH] IN BLOOD BY AUTOMATED COUNT: 11.9 % (ref 10–15)
FERRITIN SERPL-MCNC: 132 NG/ML (ref 12–150)
GFR SERPL CREATININE-BSD FRML MDRD: 79 ML/MIN/{1.73_M2}
GLUCOSE SERPL-MCNC: 91 MG/DL (ref 70–99)
HBA1C MFR BLD: 5.4 % (ref 0–5.6)
HCT VFR BLD AUTO: 39.7 % (ref 35–47)
HDLC SERPL-MCNC: 40 MG/DL
HGB BLD-MCNC: 13.2 G/DL (ref 11.7–15.7)
IRON SATN MFR SERPL: 36 % (ref 15–46)
IRON SERPL-MCNC: 109 UG/DL (ref 35–180)
LDLC SERPL CALC-MCNC: 129 MG/DL
LYMPHOCYTES NFR BLD AUTO: 28.2 %
MCH RBC QN AUTO: 28 PG (ref 26.5–33)
MCHC RBC AUTO-ENTMCNC: 33.2 G/DL (ref 31.5–36.5)
MCV RBC AUTO: 84 FL (ref 78–100)
MONOCYTES NFR BLD AUTO: 6 %
NEUTROPHILS NFR BLD AUTO: 62.2 %
NONHDLC SERPL-MCNC: 155 MG/DL
PLATELET # BLD AUTO: 255 10E9/L (ref 150–450)
POTASSIUM SERPL-SCNC: 4.1 MMOL/L (ref 3.4–5.3)
PROLACTIN SERPL-MCNC: 18 UG/L (ref 3–27)
PROT SERPL-MCNC: 7.4 G/DL (ref 6.8–8.8)
RBC # BLD AUTO: 4.71 10E12/L (ref 3.8–5.2)
SODIUM SERPL-SCNC: 138 MMOL/L (ref 133–144)
T PALLIDUM AB SER QL: NONREACTIVE
TIBC SERPL-MCNC: 306 UG/DL (ref 240–430)
TRIGL SERPL-MCNC: 128 MG/DL
TSH SERPL DL<=0.005 MIU/L-ACNC: 3.81 MU/L (ref 0.4–4)
WBC # BLD AUTO: 8 10E9/L (ref 4–11)

## 2021-03-09 PROCEDURE — 80050 GENERAL HEALTH PANEL: CPT | Performed by: PHYSICIAN ASSISTANT

## 2021-03-09 PROCEDURE — 83036 HEMOGLOBIN GLYCOSYLATED A1C: CPT | Performed by: PHYSICIAN ASSISTANT

## 2021-03-09 PROCEDURE — 82248 BILIRUBIN DIRECT: CPT | Performed by: PHYSICIAN ASSISTANT

## 2021-03-09 PROCEDURE — 80175 DRUG SCREEN QUAN LAMOTRIGINE: CPT | Mod: 90 | Performed by: PHYSICIAN ASSISTANT

## 2021-03-09 PROCEDURE — 86780 TREPONEMA PALLIDUM: CPT | Mod: 90 | Performed by: PHYSICIAN ASSISTANT

## 2021-03-09 PROCEDURE — 84146 ASSAY OF PROLACTIN: CPT | Performed by: PHYSICIAN ASSISTANT

## 2021-03-09 PROCEDURE — 36415 COLL VENOUS BLD VENIPUNCTURE: CPT | Performed by: PHYSICIAN ASSISTANT

## 2021-03-09 PROCEDURE — 99000 SPECIMEN HANDLING OFFICE-LAB: CPT | Performed by: PHYSICIAN ASSISTANT

## 2021-03-09 PROCEDURE — 83550 IRON BINDING TEST: CPT | Performed by: PHYSICIAN ASSISTANT

## 2021-03-09 PROCEDURE — 80061 LIPID PANEL: CPT | Performed by: PHYSICIAN ASSISTANT

## 2021-03-09 PROCEDURE — 82728 ASSAY OF FERRITIN: CPT | Performed by: PHYSICIAN ASSISTANT

## 2021-03-09 PROCEDURE — 82306 VITAMIN D 25 HYDROXY: CPT | Performed by: PHYSICIAN ASSISTANT

## 2021-03-09 PROCEDURE — 83540 ASSAY OF IRON: CPT | Performed by: PHYSICIAN ASSISTANT

## 2021-03-10 LAB — LAMOTRIGINE SERPL-MCNC: 4.3 UG/ML (ref 2.5–15)

## 2021-04-09 ENCOUNTER — IMMUNIZATION (OUTPATIENT)
Dept: LAB | Facility: CLINIC | Age: 41
End: 2021-04-09
Payer: COMMERCIAL

## 2021-04-13 ENCOUNTER — OFFICE VISIT (OUTPATIENT)
Dept: FAMILY MEDICINE | Facility: CLINIC | Age: 41
End: 2021-04-13
Payer: COMMERCIAL

## 2021-04-13 VITALS
TEMPERATURE: 97.2 F | BODY MASS INDEX: 37.15 KG/M2 | DIASTOLIC BLOOD PRESSURE: 82 MMHG | HEART RATE: 79 BPM | SYSTOLIC BLOOD PRESSURE: 115 MMHG | WEIGHT: 230.18 LBS | OXYGEN SATURATION: 97 %

## 2021-04-13 DIAGNOSIS — L70.0 ACNE VULGARIS: ICD-10-CM

## 2021-04-13 DIAGNOSIS — L21.9 SEBORRHEIC DERMATITIS: Primary | ICD-10-CM

## 2021-04-13 DIAGNOSIS — L98.9 SKIN LESION: ICD-10-CM

## 2021-04-13 PROCEDURE — 99203 OFFICE O/P NEW LOW 30 MIN: CPT | Performed by: STUDENT IN AN ORGANIZED HEALTH CARE EDUCATION/TRAINING PROGRAM

## 2021-04-13 RX ORDER — TRETINOIN 0.5 MG/G
CREAM TOPICAL AT BEDTIME
Qty: 45 G | Refills: 1 | Status: SHIPPED | OUTPATIENT
Start: 2021-04-13

## 2021-04-13 RX ORDER — CLINDAMYCIN AND BENZOYL PEROXIDE 10; 50 MG/G; MG/G
GEL TOPICAL 2 TIMES DAILY
Qty: 50 G | Refills: 1 | Status: SHIPPED | OUTPATIENT
Start: 2021-04-13 | End: 2024-02-14

## 2021-04-13 RX ORDER — KETOCONAZOLE 20 MG/ML
SHAMPOO TOPICAL
Qty: 120 ML | Refills: 1 | Status: SHIPPED | OUTPATIENT
Start: 2021-04-13 | End: 2022-07-04

## 2021-04-13 ASSESSMENT — ANXIETY QUESTIONNAIRES
2. NOT BEING ABLE TO STOP OR CONTROL WORRYING: NOT AT ALL
GAD7 TOTAL SCORE: 1
5. BEING SO RESTLESS THAT IT IS HARD TO SIT STILL: NOT AT ALL
6. BECOMING EASILY ANNOYED OR IRRITABLE: SEVERAL DAYS
7. FEELING AFRAID AS IF SOMETHING AWFUL MIGHT HAPPEN: NOT AT ALL
1. FEELING NERVOUS, ANXIOUS, OR ON EDGE: NOT AT ALL
7. FEELING AFRAID AS IF SOMETHING AWFUL MIGHT HAPPEN: NOT AT ALL
GAD7 TOTAL SCORE: 1
3. WORRYING TOO MUCH ABOUT DIFFERENT THINGS: NOT AT ALL
GAD7 TOTAL SCORE: 1
4. TROUBLE RELAXING: NOT AT ALL

## 2021-04-13 ASSESSMENT — PATIENT HEALTH QUESTIONNAIRE - PHQ9
SUM OF ALL RESPONSES TO PHQ QUESTIONS 1-9: 4
SUM OF ALL RESPONSES TO PHQ QUESTIONS 1-9: 4
10. IF YOU CHECKED OFF ANY PROBLEMS, HOW DIFFICULT HAVE THESE PROBLEMS MADE IT FOR YOU TO DO YOUR WORK, TAKE CARE OF THINGS AT HOME, OR GET ALONG WITH OTHER PEOPLE: SOMEWHAT DIFFICULT

## 2021-04-13 NOTE — PATIENT INSTRUCTIONS
Benzoyl peroxide-clindamycin gel twice a day.   At night, retin A cream to acne affected areas  Follow up with a moisturizer.   Use sun protection.    Follow up for recheck in 4-6 weeks if not doing better.     Also start fluconazole shampoo twice weekly for 4 weeks followed by once weekly afterwards for dandruff.    Patient Education     Adult Acne  If your skin is erupting with blemishes that you thought could only affect a teenager, you may have adult acne. This is acne in people over the age of 25. Acne in teenagers is more common in teen boys. Acne in adults is more common in women.   Acne is the term for oil-clogged pores. Pores are tiny openings on the skin that become inflamed and form blemishes. Adult acne blemishes show up mainly on the face. In women, blemishes tend to show up around the chin, mouth, jawline, and neck. In men, acne often affects the entire face. But the trunk and upper arms can also have acne.   Acne can be treated. Treatments can also decrease the scarring and changes to skin color caused by acne.   What causes acne?  Male hormones (androgens) may cause acne in some people. Women with certain conditions such as polycystic ovarian syndrome (PCOS) may make too much male hormones. Acne in women often may happen just before their menstrual periods. If you had acne when you were a teenager or if others in your family have had acne, you may be at more risk for adult acne.   Types of acne  Acne happens when certain hair follicles are damaged. One or more of 4 things happen:    The hair follicle is blocked by dead cells and oil (sebum)    The follicle makes more oil (sebum) than normal    Bacteria (P. acnes) grow in the follicle    The follicle becomes irritated (inflamed)  Four types of blemishes can appear:    Whiteheads are round, white blemishes that form when hair follicles become clogged.    Blackheads are round, dark blemishes that form when whiteheads reach the skin s surface and touch  air.    Pimples are red, swollen bumps that form when plugged follicle walls break near the skin s surface.    Deep cysts are pus-filled pimples. They form when plugged follicle walls break deep within the skin. Acne cysts are often large and painful. In some cases, they also cause scars.  How treatment can help  The goals of treatment are to keep new acne blemishes from forming and to prevent scarring and changes in skin color. You will usually need a combination of treatments. You may need to use a treatment for at least 2 months to see if it works. This is because acne lesions take at least 8 weeks to develop.     Your treatment will depend on how serious your acne is. You and your healthcare provider can discuss the best way to treat and control it. In most cases, acne treatment includes:     Good skin care that doesn t damage or irritate skin    Medicines put on the skin (topical)    Antibiotics, hormones, or both  Often you will need to take several medicines at first. For some treatments, women must use a birth control method so they won t get pregnant while being treated.   Your healthcare provider may also remove blemishes or give you injections. If you have acne scars, you may need surgery or medicines to help improve the way your skin looks. Be sure you understand your treatment plan and any side effects it might cause. You will play an important role in the success of your treatment.   [employer] last reviewed this educational content on 7/1/2020 2000-2021 [employer]. All rights reserved. This information is not intended as a substitute for professional medical care. Always follow your healthcare professional's instructions.

## 2021-04-13 NOTE — PROGRESS NOTES
Assessment & Plan     Seborrheic dermatitis  Trial of medicated shampoo twice weekly x 4 weeks then weekly thereafter. If not responding at that time, would consider additional of clotrimazole lotion. Follow up as needed.   - ketoconazole (NIZORAL) 2 % external shampoo  Dispense: 120 mL; Refill: 1    Acne vulgaris  Trial of topical therapy with Bezaclin gel twice daily and Retin-A to face and upper back. We did also discuss option of oral therapy with doxycycline however I think it would be reasonable to try topical therapy first with follow up for recheck in 4-6 weeks if symptoms persist.   - clindamycin-benzoyl peroxide (BENZACLIN) 1-5 % external gel  Dispense: 50 g; Refill: 1  - tretinoin (RETIN-A) 0.05 % external cream  Dispense: 45 g; Refill: 1    Skin lesion  Benign lesions on face consistent with spider angioma and small solar lentigo vs benign acquired nevus. Discussed symptoms to monitor for and reasons to follow up.    Cynthia Julian MD  [facility]    Dipak Aguilar is a 41 year old who presents for the following health issues     HPI     1. Facial concerns - Has a couple concerns for facial lesions--one on forehead present for a long time and the left cheek more recently but cannot recall when. Denies itching or bleeding. They seem to get bigger and smaller with time. Thinks her grandfather might have had skin cancer but isn't sure.    2. Acne - Mostly around her hairline on her neck. Also some acne on her upper back that can be rather painful. Also notes a lot of acne on the upper lip and chin. Acne present since past 4-5 months. No medication changes at that time. She has a Mirena IUD for past four years. She is using salycilic acid body and face wash and topical Retinol on her face in the evening.     3. Dandruff - present since high school. Has tried Head and Shoulders, tea gel from [employer]. Has also tried apple cider vinegar soaks.        Objective    BP  115/82 (BP Location: Right arm, Patient Position: Sitting, Cuff Size: Adult Large)   Pulse 79   Temp 97.2  F (36.2  C) (Tympanic)   Wt 104.4 kg (230 lb 2.9 oz)   SpO2 97%   BMI 37.15 kg/m    Body mass index is 37.15 kg/m .  Physical Exam   GENERAL: healthy, alert and no distress  HEENT: PERRL, EOMI  HEAD: mild diffuse scalp flaking  SKIN: 2 mm telangectasia mid forehead; 3 mm faint pigmented macule left cheek with symmetric color, good symmetry, distinct borders consistent with benign acquired nevus vs solar lentigo; scattered papules and few pustules upper back; scattered erythematous papules and comedones upper lip and chin  MS: no gross musculoskeletal defects noted, no edema

## 2021-04-14 ASSESSMENT — PATIENT HEALTH QUESTIONNAIRE - PHQ9: SUM OF ALL RESPONSES TO PHQ QUESTIONS 1-9: 4

## 2021-04-14 ASSESSMENT — ANXIETY QUESTIONNAIRES: GAD7 TOTAL SCORE: 1

## 2021-05-27 ENCOUNTER — TELEPHONE (OUTPATIENT)
Dept: FAMILY MEDICINE | Facility: CLINIC | Age: 41
End: 2021-05-27

## 2021-05-27 NOTE — TELEPHONE ENCOUNTER
PA Initiation    Medication: tretinoin (RETIN-A) 0.05 % external cream- INITIATED  Insurance Company: [employer] - Phone [phone] Fax [phone]  Pharmacy Filling the Rx: [facility] [id number] [place] [address]  Filling Pharmacy Phone: [phone]  Filling Pharmacy Fax: [phone]  Start Date: 5/27/2021

## 2021-05-27 NOTE — TELEPHONE ENCOUNTER
Prior Authorization Retail Medication Request    Medication/Dose: tretinoin (RETIN-A) 0.05 % external cream  ICD code (if different than what is on RX):  Previously Tried and Failed:  Rationale:    Insurance Name: [employer]   Insurance ID: [id number]    Pharmacy Information (if different than what is on RX)  Name:  Phone:    Please include previous medications tried and failed.  Please ask insurance for medications on formulary.

## 2021-05-27 NOTE — TELEPHONE ENCOUNTER
Prior Authorization Approval    Authorization Effective Date: 5/27/2021  Authorization Expiration Date: 5/26/2024  Medication: tretinoin (RETIN-A) 0.05 % external cream- APPROVED  Approved Dose/Quantity: 45 GM  Reference #: [id number]   Insurance Company: [employer] - Phone [phone] Fax [phone]  Expected CoPay:     $96.46  CoPay Card Available:      Foundation Assistance Needed:    Which Pharmacy is filling the prescription (Not needed for infusion/clinic administered): [facility] [id number] [place] [address]  Pharmacy Notified: Yes  Patient Notified: Yes                  Central Prior Authorization Team  Phone: [phone]

## 2021-09-26 ENCOUNTER — HEALTH MAINTENANCE LETTER (OUTPATIENT)
Age: 41
End: 2021-09-26

## 2021-11-12 ENCOUNTER — OFFICE VISIT (OUTPATIENT)
Dept: PODIATRY | Facility: CLINIC | Age: 41
End: 2021-11-12
Payer: COMMERCIAL

## 2021-11-12 VITALS — SYSTOLIC BLOOD PRESSURE: 118 MMHG | DIASTOLIC BLOOD PRESSURE: 74 MMHG

## 2021-11-12 DIAGNOSIS — M67.88 ACHILLES TENDINOSIS: ICD-10-CM

## 2021-11-12 DIAGNOSIS — M79.671 PAIN OF RIGHT HEEL: Primary | ICD-10-CM

## 2021-11-12 PROCEDURE — 99203 OFFICE O/P NEW LOW 30 MIN: CPT | Performed by: PODIATRIST

## 2021-11-12 NOTE — PATIENT INSTRUCTIONS
Thank you for choosing [facility]!    DR. MILES'S CLINIC LOCATIONS     [facility] SCHEDULE SURGERY: [phone]   [address] [place] [address] APPOINTMENTS: [phone]   [place] [address] BILLING QUESTIONS: [phone]  [phone] RADIOLOGY: [phone]       [place]    [address]    [place] [address]    [phone]  [phone]      Follow up: in 1-2 months with MyChart or by phone    Next steps:   1. Wear tilok brace for 2-4 wks with medial placed white strap.   2. Possible discuss orthotics in future if ankle Trilok brace helps.       TENDONITIS   Tendons are the strong fibrous portions of muscles that attach to bones and allow the muscle to move a joint when it contracts. Tendons are very strong because they have a lot of force exerted on them. Sometimes tendons can become painful because they have suffered an acute injury, in which too much force was exerted at one time, or an overuse injury, in which a normal force was exerted too frequently or over a prolonged period of time. As a result, there is damage to the tendon and its surrounding soft tissue structures and they become inflammed. Because tendons do not have a great blood supply, they do not heal rapidly and the inflammation can become chronic.   Conservative treatment for tendinitis involves rest and anti-inflammatory measures. Ice is applied 15 minutes 2-3 times daily. Anti-inflammatory medications called NSAIDs (ibuprofen, example) can be taken provided they are used with caution, as they can lead to internal bleeding and increase the risk ofstroke and heart attack. Sometimes topical nitroglycerin is prescribed to help with pain. Often your doctor will use a special shoe or removable walking cast to immobilize the tendon, allowing it to heal without further damage from use. These devices are very useful in helping tendons heal, but they may slow you down or make you feel  like your hip, knee, or back are out ofalignment. This is temporary and should go away once you are out ofthe immobilization. You should not use a walking cast when showering or driving. Another option is Platelet Rich Plasma injections. (Normally done with a Sports and Orthorapedic doctor.   If conservative measures fail, your physician may need to surgically repair the tendon by removing any chronic inflammatory tissue and sewing it back together. Sometimes it is sewn to an adjacent tendon with similar function for support and sometimes it is lengthened. . Sometimes the bones around the tendon need to be realigned or reshaped to better support the tendon or prevent further damage. Your foot and ankle surgeon will discuss the specifics of your surgery with you, should you need it.      Towel stretch: Sit on a hard surface with your injured leg stretched out in front of you. Loop a towel around your toes and the ball of your foot and pull the towel toward your body keeping your leg straight. Hold this position for 15 to 30 seconds and then relax. Repeat 3 times. Then push the towel away with the ball of your foot. Repeat 3 times.  When you don't feel much of a stretch using the towel, you can start the standing calf stretch and the following exercises.    Standing calf stretch: Stand facing a wall with your hands on the wall at about eye level. Keep your injured leg back with your heel on the floor. Keep the other leg forward with the knee bent. Turn your back foot slightly inward (as if you were pigeon-toed). Slowly lean into the wall until you feel a stretch in the back of your calf. Hold the stretch for 15 to 30 seconds. Return to the starting position. Repeat 3 times. Do this exercise several times each day.     Standing soleus stretch: Stand facing a wall with your hands on the wall at about chest height. Keep your injured leg back with your heel on the floor. Keep the other leg forward with the knee bent. Turn  your back foot slightly inward (as if you were pigeon-toed). Bend your back knee slightly and gently lean into the wall until you feel a stretch in the lower calf of your injured leg. Hold the stretch for 15 to 30 seconds. Return to the starting position. Repeat 3 times.     Achilles stretch: Stand with the ball of one foot on a stair. Reach for the step below with your heel until you feel a stretch in the arch of your foot. Hold this position for 15 to 30 seconds and then relax. Repeat 3 times.     Heel raise: Balance yourself while standing behind a chair or counter. Using the chair or counter as a support to help you, raise your body up onto your toes and hold for 5 seconds. Then slowly lower yourself down without holding onto the support. (It's OK to keep holding onto the support if you need to.) When this exercise becomes less painful, try lowering yourself down on the injured leg only. Repeat 15 times. Do 2 sets of 15. Rest 30 seconds between sets.     Step-up: Stand with the foot of your injured leg on a support 3 to 5 inches high (like a small step or block of wood). Keep your other foot flat on the floor. Shift your weight onto the injured leg on the support. Straighten your injured leg as the other leg comes off the floor. Return to the starting position by bending your injured leg and slowly lowering your uninjured leg back to the floor. Do 2 sets of 15.     Resisted ankle eversion: Sit with both legs stretched out in front of you, with your feet about a shoulder's width apart. Tie a loop in one end of elastic tubing. Put the foot of your injured leg through the loop so that the tubing goes around the arch of that foot and wraps around the outside of the other foot. Hold onto the other end of the tubing with your hand to provide tension. Turn the foot of your injured leg up and out. Make sure you keep your other foot still so that it will allow the tubing to stretch as you move the foot of your injured  leg. Return to the starting position. Do 2 sets of 15.     Balance and reach exercises: Stand next to a chair with your injured leg farther from the chair. The chair will provide support if you need it. Stand on the foot of your injured leg and bend your knee slightly. Try to raise the arch of this foot while keeping your big toe on the floor. Keep your foot in this position. With the hand that is farther away from the chair, reach forward in front of you by bending at the waist. Avoid bending your knee any more as you do this. Repeat this 10 times. To make the exercise more challenging, reach farther in front of you. Do 2 sets of 10.  the same position as above. While keeping your arch height, reach the hand that is farther away from the chair across your body toward the chair. The farther you reach, the more challenging the exercise. Do 2 sets of 10.     Resisted ankle eversion: Sit with both legs stretched out in front of you, with your feet about a shoulder's width apart. Tie a loop in one end of elastic tubing. Put the foot of your injured leg through the loop so that the tubing goes around the arch of that foot and wraps around the outside of the other foot. Hold onto the other end of the tubing with your hand to provide tension. Turn the foot of your injured leg up and out. Make sure you keep your other foot still so that it will allow the tubing to stretch as you move the foot of your injured leg. Return to the starting position. Do 2 sets of 15.   If you have access to a wobble board, do the following exercises:  Wobble board exercises:     Stand on a wobble board with your feet shoulder width apart. Rock the board forwards and backwards 30 times, then side to side 30 times. Hold on to a chair if you need support.     Rotate the wobble board around so that the edge of the board is in contact with the floor at all times. Do this 30 times in a clockwise and then a counterclockwise direction.      Balance on the wobble board for as long as you can without letting the edges touch the floor. Try to do this for 2 minutes without touching the floor.     Rotate the wobble board in clockwise and counterclockwise circles, but do not let the edge of the board touch the floor.     When you have mastered exercises A through D, try repeating them while standing on just your injured leg.     After you are able to do these exercises on one leg, try to do them with your eyes closed. Make sure you have something nearby to support you in case you lose your balance.          Flu vaccines are now available at all [facility] clinics and retail pharmacies across the [facility]. Appointments are required for clinic locations. To schedule an appointment online, please log into [employer] or create an account if you are a new user. You can also call [phone], or simply walk in at one of the [facility] retail pharmacy locations.

## 2021-11-12 NOTE — LETTER
11/12/2021         RE: Lauren Callaway  [address]  [facility] [address]        Dear Colleague,    Thank you for referring your patient, Lauren Callaway, to the [facility]. Please see a copy of my visit note below.    ASSESSMENT:  Encounter Diagnoses   Name Primary?     Pain of right heel Yes     insertional, right Achilles tendinosis        MEDICAL DECISION MAKING:  Her history and location of pain is consistent with an insertional Achilles tendinosis.  This might be a chronic condition, as she has experienced it for a year.  If so, I explained that our goal is to reduce pain is much as possible.    I highly encouraged physical therapy sooner than later.  She is agreeable.  Tri-Lock ankle brace with foot strap medial.  Medial support helps, we will consider custom molded multidensity orthotics.  She is encouraged to wear a supportive athletic shoe is much as possible.  Calf and Achilles stretching exercises were reviewed.    Ice, NSAIDs and Tylenol, as needed.    If conservative cares do not adequately reduce her pain, I did discuss options including the Tenex procedure, platelet rich plasma therapy and surgical intervention.    Disclaimer: This note consists of symbols derived from keyboarding, dictation and/or voice recognition software. As a result, there may be errors in the script that have gone undetected. Please consider this when interpreting information found in this chart.    Micah Siddiqui DPM, FACFAS, [facility] Department of Podiatry/Foot & Ankle Surgery      ____________________________________________________________________    HPI:         Chief Complaint: Posterior right heel pain  Onset of problem: 1 year  Pain/ discomfort is described as: Burning  Frequency: Daily  The pain is exacerbated by weightbearing activities  Previous treatment: She is wearing athletic shoes and has tried some stretching exercises.  *  Past Medical History:   Diagnosis Date      Gallstones      NO ACTIVE PROBLEMS    *  *  Past Surgical History:   Procedure Laterality Date     LAPAROSCOPIC CHOLECYSTECTOMY  7/5/2014    Procedure: LAPAROSCOPIC CHOLECYSTECTOMY;  Surgeon: Pako Sherman MD;  Location:  OR     NO HISTORY OF SURGERY     *  *  Current Outpatient Medications   Medication Sig Dispense Refill     clindamycin-benzoyl peroxide (BENZACLIN) 1-5 % external gel Apply topically 2 times daily 50 g 1     IBUPROFEN PO Take 400 mg by mouth every 6 hours as needed for moderate pain       ketoconazole (NIZORAL) 2 % external shampoo Twice weekly for 4 weeks then weekly. 120 mL 1     lamoTRIgine (LAMICTAL) 150 MG tablet        risperiDONE (RISPERDAL) 1 MG tablet Take 1 tablet (1 mg) by mouth 2 times daily       tretinoin (RETIN-A) 0.05 % external cream Apply topically At Bedtime 45 g 1     venlafaxine (EFFEXOR-XR) 150 MG 24 hr capsule            EXAM:    Vitals: /74   BMI: There is no height or weight on file to calculate BMI.    Constitutional:  Lauren Callaway is in no apparent distress, appears well-nourished.  Cooperative with history and physical exam.    Vascular:  Pedal pulses are palpable for both the DP and PT arteries.  CFT < 3 sec.  No edema.      Neuro: Light touch sensation is intact to the L4, L5, S1 distributions  No evidence of weakness, spasticity, or contracture in the lower extremities.     Derm: Normal texture and turgor.  No erythema, ecchymosis, or cyanosis.  No open lesions.     Musculoskeletal:    Lower extremity muscle strength is normal. No gross deformities.  She has some pain on palpation on the back of the right heel, level of the Achilles tendon insertion.  No pain with forceful plantarflexion of the forefoot against resistance.  She has some pain with passive stretch of the posterior leg tissues.        Again, thank you for allowing me to participate in the care of your patient.        Sincerely,        Micah Siddiqui DPM

## 2021-11-12 NOTE — PROGRESS NOTES
ASSESSMENT:  Encounter Diagnoses   Name Primary?     Pain of right heel Yes     insertional, right Achilles tendinosis        MEDICAL DECISION MAKING:  Her history and location of pain is consistent with an insertional Achilles tendinosis.  This might be a chronic condition, as she has experienced it for a year.  If so, I explained that our goal is to reduce pain is much as possible.    I highly encouraged physical therapy sooner than later.  She is agreeable.  Tri-Lock ankle brace with foot strap medial.  Medial support helps, we will consider custom molded multidensity orthotics.  She is encouraged to wear a supportive athletic shoe is much as possible.  Calf and Achilles stretching exercises were reviewed.    Ice, NSAIDs and Tylenol, as needed.    If conservative cares do not adequately reduce her pain, I did discuss options including the Tenex procedure, platelet rich plasma therapy and surgical intervention.    Disclaimer: This note consists of symbols derived from keyboarding, dictation and/or voice recognition software. As a result, there may be errors in the script that have gone undetected. Please consider this when interpreting information found in this chart.    Micah Siddiqui DPM, FACFAS, MS    [facility]      ____________________________________________________________________    HPI:         Chief Complaint: Posterior right heel pain  Onset of problem: 1 year  Pain/ discomfort is described as: Burning  Frequency: Daily  The pain is exacerbated by weightbearing activities  Previous treatment: She is wearing athletic shoes and has tried some stretching exercises.  *  Past Medical History:   Diagnosis Date     Gallstones      NO ACTIVE PROBLEMS    *  *  Past Surgical History:   Procedure Laterality Date     LAPAROSCOPIC CHOLECYSTECTOMY  7/5/2014    Procedure: LAPAROSCOPIC CHOLECYSTECTOMY;  Surgeon: Pako Sherman MD;  Location: [facility] OR     NO HISTORY OF SURGERY      *  *  Current Outpatient Medications   Medication Sig Dispense Refill     clindamycin-benzoyl peroxide (BENZACLIN) 1-5 % external gel Apply topically 2 times daily 50 g 1     IBUPROFEN PO Take 400 mg by mouth every 6 hours as needed for moderate pain       ketoconazole (NIZORAL) 2 % external shampoo Twice weekly for 4 weeks then weekly. 120 mL 1     lamoTRIgine (LAMICTAL) 150 MG tablet        risperiDONE (RISPERDAL) 1 MG tablet Take 1 tablet (1 mg) by mouth 2 times daily       tretinoin (RETIN-A) 0.05 % external cream Apply topically At Bedtime 45 g 1     venlafaxine (EFFEXOR-XR) 150 MG 24 hr capsule            EXAM:    Vitals: /74   BMI: There is no height or weight on file to calculate BMI.    Constitutional:  Lauren Callaway is in no apparent distress, appears well-nourished.  Cooperative with history and physical exam.    Vascular:  Pedal pulses are palpable for both the DP and PT arteries.  CFT < 3 sec.  No edema.      Neuro: Light touch sensation is intact to the L4, L5, S1 distributions  No evidence of weakness, spasticity, or contracture in the lower extremities.     Derm: Normal texture and turgor.  No erythema, ecchymosis, or cyanosis.  No open lesions.     Musculoskeletal:    Lower extremity muscle strength is normal. No gross deformities.  She has some pain on palpation on the back of the right heel, level of the Achilles tendon insertion.  No pain with forceful plantarflexion of the forefoot against resistance.  She has some pain with passive stretch of the posterior leg tissues.

## 2021-11-16 ENCOUNTER — THERAPY VISIT (OUTPATIENT)
Dept: PHYSICAL THERAPY | Facility: CLINIC | Age: 41
End: 2021-11-16
Attending: PODIATRIST
Payer: COMMERCIAL

## 2021-11-16 DIAGNOSIS — M67.88 ACHILLES TENDINOSIS OF RIGHT LOWER EXTREMITY: ICD-10-CM

## 2021-11-16 DIAGNOSIS — M67.88 ACHILLES TENDINOSIS: ICD-10-CM

## 2021-11-16 DIAGNOSIS — M79.671 PAIN OF RIGHT HEEL: ICD-10-CM

## 2021-11-16 PROCEDURE — 97530 THERAPEUTIC ACTIVITIES: CPT | Mod: GP | Performed by: PHYSICAL THERAPIST

## 2021-11-16 PROCEDURE — 97161 PT EVAL LOW COMPLEX 20 MIN: CPT | Mod: GP | Performed by: PHYSICAL THERAPIST

## 2021-11-16 PROCEDURE — 97110 THERAPEUTIC EXERCISES: CPT | Mod: GP | Performed by: PHYSICAL THERAPIST

## 2021-11-16 NOTE — PROGRESS NOTES
Physical Therapy Initial Evaluation  Subjective:  The history is provided by the patient. No  was used.   Therapist Generated HPI Evaluation  Problem details: 11/12/21. Pt started having R heel pain about 1 year ago. Denies specific RASTA. Denies history of ankle and foot problems. Her R heel pain has improved with using prescribed ankle brace. Pt was referred to PT on 11/12/21..         Type of problem:  Right foot.    This is a chronic condition.  Condition occurred with:  Insidious onset.  Where condition occurred: for unknown reasons.  Patient reports pain:  Posterior.  Pain is described as other (hot poker) and is intermittent.  Pain is worse in the P.M..  Since onset symptoms are gradually worsening.  Symptoms are exacerbated by walking, standing, ascending stairs and descending stairs  and relieved by rest and bracing/immobilizing.      Restrictions due to condition include:  Working in normal job without restrictions.  Barriers include:  Stairs.    Patient Health History  Lauren Callaway being seen for heel pain.          Pain is reported as 8/10 on pain scale.  General health as reported by patient is fair (d/t lack of exercise).  Pertinent medical history includes: depression, mental illness, migraines/headaches and overweight.   Red flags:  None as reported by patient.  Medical allergies: none.   Surgeries include:  None.    Current medications:  Anti-depressants.    Current occupation .   Primary job tasks include:  Computer work and prolonged sitting.                                    Objective:    Gait:    Assistive Devices:  None  Deviations:  Ankle:  Medial heel whip R and medial heel whip LGeneral Deviations:  Toe out R and toe out L          Ankle/Foot Evaluation  ROM:  AROM is normal.      Strength:    Dorsiflexion:  Left: 5/5      Pain:-   Right: 5/5    Pain:-    Inversion:Left: 5/5   Pain:-     Right: 5/5   Pain:-  Eversion:Left: 5/5   Pain:-  Right: 5/5    Pain:-  Flexion Great Toe:Left: 5/5   Pain:-  Right: 5/5    Pain:-  Extension Great Toe:Left: 5/5   Pain:-  Right: 5/5   Pain:-          Gastroc/Soleus:Left: 5/5    Pain:-  Right: 5/5   Pain:-    SPECIAL TESTS:     Left ankle negative for the following special tests:  Rajput sign    Right ankle negative for the following special tests:  Rajput sign  PALPATION: Palpation of ankle: TTP at distal R Achilles tendon at insertion on posterior calcaneus.    Right ankle tenderness present at:   achilles tendon  Right ankle tenderness not present at:  gastroc/soleus or plantar fascia  EDEMA: normal                                                              General     ROS    Assessment/Plan:    Patient is a 41 year old female with right side ankle complaints.    Patient has the following significant findings with corresponding treatment plan.                Diagnosis 1:  Insertional R Achilles tendinosis  Pain -  hot/cold therapy, US, manual therapy, splint/taping/bracing/orthotics, self management, education and home program  Decreased ROM/flexibility - manual therapy, therapeutic exercise and home program  Decreased proprioception - neuro re-education, gait training, therapeutic activities and home program  Impaired gait - gait training and home program  Impaired muscle performance - neuro re-education and home program  Decreased function - therapeutic activities and home program    Therapy Evaluation Codes:   1) History comprised of:   Personal factors that impact the plan of care:      Time since onset of symptoms.    Comorbidity factors that impact the plan of care are:      Depression, Mental illness, Migraines/headaches and Overweight.     Medications impacting care: Anti-depressant.  2) Examination of Body Systems comprised of:   Body structures and functions that impact the plan of care:      Ankle.   Activity limitations that impact the plan of care are:      Stairs, Standing and Walking.  3) Clinical  presentation characteristics are:   Stable/Uncomplicated.  4) Decision-Making    Low complexity using standardized patient assessment instrument and/or measureable assessment of functional outcome.  Cumulative Therapy Evaluation is: Low complexity.    Previous and current functional limitations:  (See Goal Flow Sheet for this information)    Short term and Long term goals: (See Goal Flow Sheet for this information)     Communication ability:  Patient appears to be able to clearly communicate and understand verbal and written communication and follow directions correctly.  Treatment Explanation - The following has been discussed with the patient:   RX ordered/plan of care  Anticipated outcomes  Possible risks and side effects  This patient would benefit from PT intervention to resume normal activities.   Rehab potential is good.    Frequency:  1 X week, once daily  Duration:  for 6 weeks tapering to 1 X every other week over 4 weeks  Discharge Plan:  Achieve all LTG.  Independent in home treatment program.  Reach maximal therapeutic benefit.    Please refer to the daily flowsheet for treatment today, total treatment time and time spent performing 1:1 timed codes.

## 2021-11-18 PROBLEM — M67.88 ACHILLES TENDINOSIS OF RIGHT LOWER EXTREMITY: Status: ACTIVE | Noted: 2021-11-18

## 2021-11-18 PROBLEM — M79.671 PAIN OF RIGHT HEEL: Status: ACTIVE | Noted: 2021-11-18

## 2021-12-09 ENCOUNTER — THERAPY VISIT (OUTPATIENT)
Dept: PHYSICAL THERAPY | Facility: CLINIC | Age: 41
End: 2021-12-09
Payer: COMMERCIAL

## 2021-12-09 DIAGNOSIS — M79.671 PAIN OF RIGHT HEEL: ICD-10-CM

## 2021-12-09 DIAGNOSIS — M67.88 ACHILLES TENDINOSIS OF RIGHT LOWER EXTREMITY: ICD-10-CM

## 2021-12-09 PROCEDURE — 97112 NEUROMUSCULAR REEDUCATION: CPT | Mod: GP | Performed by: PHYSICAL THERAPIST

## 2021-12-09 PROCEDURE — 97110 THERAPEUTIC EXERCISES: CPT | Mod: GP | Performed by: PHYSICAL THERAPIST

## 2021-12-09 PROCEDURE — 97140 MANUAL THERAPY 1/> REGIONS: CPT | Mod: GP | Performed by: PHYSICAL THERAPIST

## 2021-12-15 ENCOUNTER — THERAPY VISIT (OUTPATIENT)
Dept: PHYSICAL THERAPY | Facility: CLINIC | Age: 41
End: 2021-12-15
Payer: COMMERCIAL

## 2021-12-15 DIAGNOSIS — M67.88 ACHILLES TENDINOSIS OF RIGHT LOWER EXTREMITY: ICD-10-CM

## 2021-12-15 DIAGNOSIS — M79.671 PAIN OF RIGHT HEEL: ICD-10-CM

## 2021-12-15 PROCEDURE — 97112 NEUROMUSCULAR REEDUCATION: CPT | Mod: GP | Performed by: PHYSICAL THERAPIST

## 2021-12-15 PROCEDURE — 97140 MANUAL THERAPY 1/> REGIONS: CPT | Mod: GP | Performed by: PHYSICAL THERAPIST

## 2021-12-15 PROCEDURE — 97110 THERAPEUTIC EXERCISES: CPT | Mod: GP | Performed by: PHYSICAL THERAPIST

## 2021-12-22 ENCOUNTER — THERAPY VISIT (OUTPATIENT)
Dept: PHYSICAL THERAPY | Facility: CLINIC | Age: 41
End: 2021-12-22
Payer: COMMERCIAL

## 2021-12-22 DIAGNOSIS — M79.671 PAIN OF RIGHT HEEL: ICD-10-CM

## 2021-12-22 DIAGNOSIS — M67.88 ACHILLES TENDINOSIS OF RIGHT LOWER EXTREMITY: ICD-10-CM

## 2021-12-22 PROCEDURE — 97110 THERAPEUTIC EXERCISES: CPT | Mod: GP | Performed by: PHYSICAL THERAPIST

## 2021-12-22 PROCEDURE — 97112 NEUROMUSCULAR REEDUCATION: CPT | Mod: GP | Performed by: PHYSICAL THERAPIST

## 2021-12-22 PROCEDURE — 97140 MANUAL THERAPY 1/> REGIONS: CPT | Mod: GP | Performed by: PHYSICAL THERAPIST

## 2021-12-31 ENCOUNTER — THERAPY VISIT (OUTPATIENT)
Dept: PHYSICAL THERAPY | Facility: CLINIC | Age: 41
End: 2021-12-31
Payer: COMMERCIAL

## 2021-12-31 DIAGNOSIS — M67.88 ACHILLES TENDINOSIS OF RIGHT LOWER EXTREMITY: ICD-10-CM

## 2021-12-31 DIAGNOSIS — M79.671 PAIN OF RIGHT HEEL: ICD-10-CM

## 2021-12-31 PROCEDURE — 97110 THERAPEUTIC EXERCISES: CPT | Mod: GP | Performed by: PHYSICAL THERAPIST

## 2021-12-31 PROCEDURE — 97112 NEUROMUSCULAR REEDUCATION: CPT | Mod: GP | Performed by: PHYSICAL THERAPIST

## 2021-12-31 PROCEDURE — 97140 MANUAL THERAPY 1/> REGIONS: CPT | Mod: GP | Performed by: PHYSICAL THERAPIST

## 2022-07-01 DIAGNOSIS — L21.9 SEBORRHEIC DERMATITIS: ICD-10-CM

## 2022-07-04 RX ORDER — KETOCONAZOLE 20 MG/ML
SHAMPOO TOPICAL
Qty: 120 ML | Refills: 0 | Status: SHIPPED | OUTPATIENT
Start: 2022-07-04

## 2022-07-04 NOTE — TELEPHONE ENCOUNTER
,  --Please contact patient and ask to schedule an annual visit/physical at [facility].      ---Prescription approved per [facility] Refill Protocol.       Debra Hansen RN BSN     [facility]                  --Last visit:  4/13/2021 [facility] for acute care.    --Future Visit: none.

## 2022-08-04 DIAGNOSIS — L21.9 SEBORRHEIC DERMATITIS: ICD-10-CM

## 2022-08-07 NOTE — CONFIDENTIAL NOTE
Routing refill request to provider for review/approval because:  Patient needs to be seen because it has been more than 1 year since last office visit.    Last Written Prescription Date:  7/4/22  Last Fill Quantity: 120 ml,  # refills: 0   Last office visit: 4/13/2021 with prescribing provider:  Iesha   Future Office Visit:      Scheduling: please reach out to patient for annual    PRAVIN Bernard RN  [facility]

## 2022-08-08 NOTE — TELEPHONE ENCOUNTER
Please ask patient to schedule a physical with any provider taking new patients.    Cynthia Julian MD  [facility]  [phone]

## 2022-08-12 RX ORDER — KETOCONAZOLE 20 MG/ML
SHAMPOO TOPICAL
Qty: 120 ML | Refills: 0 | OUTPATIENT
Start: 2022-08-12

## 2022-08-12 NOTE — TELEPHONE ENCOUNTER
Patient has up coming appt sched with OBGYN for her physical on 11/3/22   Sent a my chart message stating she needs to establish care with a new provider that is excepting new patients for medication refills

## 2022-08-16 ENCOUNTER — LAB (OUTPATIENT)
Dept: LAB | Facility: CLINIC | Age: 42
End: 2022-08-16
Payer: COMMERCIAL

## 2022-08-16 DIAGNOSIS — F34.1 DYSTHYMIC DISORDER: Primary | ICD-10-CM

## 2022-08-16 DIAGNOSIS — F31.9 BIPOLAR DISORDER, UNSPECIFIED (H): ICD-10-CM

## 2022-08-16 DIAGNOSIS — F41.9 ANXIETY: ICD-10-CM

## 2022-08-16 DIAGNOSIS — F90.0 ATTENTION DEFICIT HYPERACTIVITY DISORDER, INATTENTIVE TYPE: ICD-10-CM

## 2022-08-16 DIAGNOSIS — F43.10 POSTTRAUMATIC STRESS DISORDER: ICD-10-CM

## 2022-08-16 LAB
ALBUMIN SERPL-MCNC: 3.6 G/DL (ref 3.4–5)
ALP SERPL-CCNC: 110 U/L (ref 40–150)
ALT SERPL W P-5'-P-CCNC: 30 U/L (ref 0–50)
ANION GAP SERPL CALCULATED.3IONS-SCNC: 9 MMOL/L (ref 3–14)
AST SERPL W P-5'-P-CCNC: 20 U/L (ref 0–45)
BASOPHILS # BLD AUTO: 0 10E3/UL (ref 0–0.2)
BASOPHILS NFR BLD AUTO: 0 %
BILIRUB DIRECT SERPL-MCNC: 0.1 MG/DL (ref 0–0.2)
BILIRUB SERPL-MCNC: 0.3 MG/DL (ref 0.2–1.3)
BUN SERPL-MCNC: 15 MG/DL (ref 7–30)
CALCIUM SERPL-MCNC: 9.6 MG/DL (ref 8.5–10.1)
CHLORIDE BLD-SCNC: 103 MMOL/L (ref 94–109)
CHOLEST SERPL-MCNC: 191 MG/DL
CO2 SERPL-SCNC: 25 MMOL/L (ref 20–32)
CREAT SERPL-MCNC: 1.01 MG/DL (ref 0.52–1.04)
DEPRECATED CALCIDIOL+CALCIFEROL SERPL-MC: 36 UG/L (ref 20–75)
EOSINOPHIL # BLD AUTO: 0.2 10E3/UL (ref 0–0.7)
EOSINOPHIL NFR BLD AUTO: 2 %
ERYTHROCYTE [DISTWIDTH] IN BLOOD BY AUTOMATED COUNT: 12 % (ref 10–15)
FASTING STATUS PATIENT QL REPORTED: YES
GFR SERPL CREATININE-BSD FRML MDRD: 71 ML/MIN/1.73M2
GLUCOSE BLD-MCNC: 95 MG/DL (ref 70–99)
HBA1C MFR BLD: 5.4 % (ref 0–5.6)
HCT VFR BLD AUTO: 40.3 % (ref 35–47)
HDLC SERPL-MCNC: 41 MG/DL
HGB BLD-MCNC: 13.5 G/DL (ref 11.7–15.7)
IMM GRANULOCYTES # BLD: 0 10E3/UL
IMM GRANULOCYTES NFR BLD: 0 %
LDLC SERPL CALC-MCNC: 114 MG/DL
LYMPHOCYTES # BLD AUTO: 2.6 10E3/UL (ref 0.8–5.3)
LYMPHOCYTES NFR BLD AUTO: 25 %
MCH RBC QN AUTO: 28.4 PG (ref 26.5–33)
MCHC RBC AUTO-ENTMCNC: 33.5 G/DL (ref 31.5–36.5)
MCV RBC AUTO: 85 FL (ref 78–100)
MONOCYTES # BLD AUTO: 0.5 10E3/UL (ref 0–1.3)
MONOCYTES NFR BLD AUTO: 5 %
NEUTROPHILS # BLD AUTO: 7.3 10E3/UL (ref 1.6–8.3)
NEUTROPHILS NFR BLD AUTO: 68 %
NONHDLC SERPL-MCNC: 150 MG/DL
PLATELET # BLD AUTO: 270 10E3/UL (ref 150–450)
POTASSIUM BLD-SCNC: 4.1 MMOL/L (ref 3.4–5.3)
PROT SERPL-MCNC: 7.8 G/DL (ref 6.8–8.8)
RBC # BLD AUTO: 4.75 10E6/UL (ref 3.8–5.2)
SODIUM SERPL-SCNC: 137 MMOL/L (ref 133–144)
T4 FREE SERPL-MCNC: 1.04 NG/DL (ref 0.76–1.46)
TRIGL SERPL-MCNC: 180 MG/DL
TSH SERPL DL<=0.005 MIU/L-ACNC: 4.25 MU/L (ref 0.4–4)
WBC # BLD AUTO: 10.8 10E3/UL (ref 4–11)

## 2022-08-16 PROCEDURE — 80050 GENERAL HEALTH PANEL: CPT

## 2022-08-16 PROCEDURE — 83036 HEMOGLOBIN GLYCOSYLATED A1C: CPT

## 2022-08-16 PROCEDURE — 84439 ASSAY OF FREE THYROXINE: CPT

## 2022-08-16 PROCEDURE — 80061 LIPID PANEL: CPT

## 2022-08-16 PROCEDURE — 82306 VITAMIN D 25 HYDROXY: CPT

## 2022-08-16 PROCEDURE — 36415 COLL VENOUS BLD VENIPUNCTURE: CPT

## 2022-08-16 PROCEDURE — 82248 BILIRUBIN DIRECT: CPT

## 2022-11-03 ENCOUNTER — OFFICE VISIT (OUTPATIENT)
Dept: OBGYN | Facility: CLINIC | Age: 42
End: 2022-11-03
Payer: COMMERCIAL

## 2022-11-03 VITALS
OXYGEN SATURATION: 95 % | DIASTOLIC BLOOD PRESSURE: 80 MMHG | HEIGHT: 66 IN | SYSTOLIC BLOOD PRESSURE: 102 MMHG | BODY MASS INDEX: 36.96 KG/M2 | HEART RATE: 98 BPM | WEIGHT: 230 LBS

## 2022-11-03 DIAGNOSIS — Z01.419 ENCOUNTER FOR GYNECOLOGICAL EXAMINATION WITHOUT ABNORMAL FINDING: Primary | ICD-10-CM

## 2022-11-03 DIAGNOSIS — Z30.431 IUD CHECK UP: ICD-10-CM

## 2022-11-03 DIAGNOSIS — Z12.31 ENCOUNTER FOR SCREENING MAMMOGRAM FOR BREAST CANCER: ICD-10-CM

## 2022-11-03 PROCEDURE — 99386 PREV VISIT NEW AGE 40-64: CPT | Performed by: OBSTETRICS & GYNECOLOGY

## 2022-11-03 RX ORDER — SPIRONOLACTONE 25 MG/1
TABLET ORAL
COMMUNITY
Start: 2022-10-24

## 2022-11-03 RX ORDER — DEXTROAMPHETAMINE SULFATE, DEXTROAMPHETAMINE SACCHARATE, AMPHETAMINE SULFATE AND AMPHETAMINE ASPARTATE 5; 5; 5; 5 MG/1; MG/1; MG/1; MG/1
CAPSULE, EXTENDED RELEASE ORAL
COMMUNITY
Start: 2022-09-29 | End: 2023-10-31

## 2023-01-12 ENCOUNTER — ANCILLARY ORDERS (OUTPATIENT)
Dept: OBGYN | Facility: CLINIC | Age: 43
End: 2023-01-12

## 2023-01-12 DIAGNOSIS — Z12.31 VISIT FOR SCREENING MAMMOGRAM: ICD-10-CM

## 2023-01-12 DIAGNOSIS — Z12.31 ENCOUNTER FOR SCREENING MAMMOGRAM FOR BREAST CANCER: ICD-10-CM

## 2023-01-13 ENCOUNTER — ANCILLARY PROCEDURE (OUTPATIENT)
Dept: MAMMOGRAPHY | Facility: CLINIC | Age: 43
End: 2023-01-13
Attending: OBSTETRICS & GYNECOLOGY
Payer: COMMERCIAL

## 2023-01-13 DIAGNOSIS — Z12.31 VISIT FOR SCREENING MAMMOGRAM: ICD-10-CM

## 2023-01-13 DIAGNOSIS — Z12.31 ENCOUNTER FOR SCREENING MAMMOGRAM FOR BREAST CANCER: ICD-10-CM

## 2023-01-13 PROCEDURE — 77067 SCR MAMMO BI INCL CAD: CPT | Mod: TC | Performed by: RADIOLOGY

## 2023-01-13 PROCEDURE — 77063 BREAST TOMOSYNTHESIS BI: CPT | Mod: TC | Performed by: RADIOLOGY

## 2023-10-17 ENCOUNTER — LAB (OUTPATIENT)
Dept: LAB | Facility: CLINIC | Age: 43
End: 2023-10-17
Payer: COMMERCIAL

## 2023-10-17 DIAGNOSIS — F34.1 PERSISTENT DEPRESSIVE DISORDER: Primary | ICD-10-CM

## 2023-10-17 DIAGNOSIS — F31.9 BIPOLAR DISORDER, UNSPECIFIED (H): ICD-10-CM

## 2023-10-17 LAB
ALBUMIN SERPL BCG-MCNC: 4.4 G/DL (ref 3.5–5.2)
ALP SERPL-CCNC: 115 U/L (ref 35–104)
ALT SERPL W P-5'-P-CCNC: 20 U/L (ref 0–50)
ANION GAP SERPL CALCULATED.3IONS-SCNC: 13 MMOL/L (ref 7–15)
AST SERPL W P-5'-P-CCNC: 22 U/L (ref 0–45)
BASO+EOS+MONOS # BLD AUTO: NORMAL 10*3/UL
BASO+EOS+MONOS NFR BLD AUTO: NORMAL %
BASOPHILS # BLD AUTO: 0.1 10E3/UL (ref 0–0.2)
BASOPHILS NFR BLD AUTO: 1 %
BILIRUB DIRECT SERPL-MCNC: <0.2 MG/DL (ref 0–0.3)
BILIRUB SERPL-MCNC: 0.3 MG/DL
BUN SERPL-MCNC: 11.6 MG/DL (ref 6–20)
CALCIUM SERPL-MCNC: 9.4 MG/DL (ref 8.6–10)
CHLORIDE SERPL-SCNC: 102 MMOL/L (ref 98–107)
CHOLEST SERPL-MCNC: 193 MG/DL
CREAT SERPL-MCNC: 0.85 MG/DL (ref 0.51–0.95)
DEPRECATED HCO3 PLAS-SCNC: 25 MMOL/L (ref 22–29)
EGFRCR SERPLBLD CKD-EPI 2021: 87 ML/MIN/1.73M2
EOSINOPHIL # BLD AUTO: 0.2 10E3/UL (ref 0–0.7)
EOSINOPHIL NFR BLD AUTO: 2 %
ERYTHROCYTE [DISTWIDTH] IN BLOOD BY AUTOMATED COUNT: 12 % (ref 10–15)
GLUCOSE SERPL-MCNC: 89 MG/DL (ref 70–99)
HBA1C MFR BLD: 5.7 % (ref 0–5.6)
HCT VFR BLD AUTO: 40.1 % (ref 35–47)
HDLC SERPL-MCNC: 40 MG/DL
HGB BLD-MCNC: 13.2 G/DL (ref 11.7–15.7)
IMM GRANULOCYTES # BLD: 0 10E3/UL
IMM GRANULOCYTES NFR BLD: 0 %
LDLC SERPL CALC-MCNC: 126 MG/DL
LYMPHOCYTES # BLD AUTO: 3 10E3/UL (ref 0.8–5.3)
LYMPHOCYTES NFR BLD AUTO: 28 %
MCH RBC QN AUTO: 28 PG (ref 26.5–33)
MCHC RBC AUTO-ENTMCNC: 32.9 G/DL (ref 31.5–36.5)
MCV RBC AUTO: 85 FL (ref 78–100)
MONOCYTES # BLD AUTO: 0.4 10E3/UL (ref 0–1.3)
MONOCYTES NFR BLD AUTO: 4 %
NEUTROPHILS # BLD AUTO: 7 10E3/UL (ref 1.6–8.3)
NEUTROPHILS NFR BLD AUTO: 66 %
NONHDLC SERPL-MCNC: 153 MG/DL
PLATELET # BLD AUTO: 277 10E3/UL (ref 150–450)
POTASSIUM SERPL-SCNC: 3.8 MMOL/L (ref 3.4–5.3)
PROT SERPL-MCNC: 7.4 G/DL (ref 6.4–8.3)
RBC # BLD AUTO: 4.72 10E6/UL (ref 3.8–5.2)
SODIUM SERPL-SCNC: 140 MMOL/L (ref 135–145)
TRIGL SERPL-MCNC: 136 MG/DL
TSH SERPL DL<=0.005 MIU/L-ACNC: 3.21 UIU/ML (ref 0.3–4.2)
VIT D+METAB SERPL-MCNC: 29 NG/ML (ref 20–50)
WBC # BLD AUTO: 10.6 10E3/UL (ref 4–11)

## 2023-10-17 PROCEDURE — 80053 COMPREHEN METABOLIC PANEL: CPT

## 2023-10-17 PROCEDURE — 80061 LIPID PANEL: CPT

## 2023-10-17 PROCEDURE — 82306 VITAMIN D 25 HYDROXY: CPT

## 2023-10-17 PROCEDURE — 82248 BILIRUBIN DIRECT: CPT

## 2023-10-17 PROCEDURE — 84443 ASSAY THYROID STIM HORMONE: CPT

## 2023-10-17 PROCEDURE — 83036 HEMOGLOBIN GLYCOSYLATED A1C: CPT

## 2023-10-17 PROCEDURE — 85025 COMPLETE CBC W/AUTO DIFF WBC: CPT

## 2023-10-17 PROCEDURE — 36415 COLL VENOUS BLD VENIPUNCTURE: CPT

## 2023-10-30 ENCOUNTER — NURSE TRIAGE (OUTPATIENT)
Dept: NURSING | Facility: CLINIC | Age: 43
End: 2023-10-30
Payer: COMMERCIAL

## 2023-10-30 NOTE — TELEPHONE ENCOUNTER
Lauren has tested Positive for Covid today. She is interested in Paxlovid treatment    Symptoms started yesterday:  - Chills  - Headache  - Sore throat  - Fatigue  - Slight Cough  - Diarrhea (today)  - Post nasal drip    Risk factors:  - BMI >30  - Mental Health disorders    4:46 pm - Warm tx'd to Cristina, for Virtual Visit for Covid treatment    COVID Positive/Requesting COVID treatment    Patient is positive for COVID and requesting treatment options.    Date of positive COVID test (PCR or at home)? 10/30/23  Current COVID symptoms: fever or chills, cough, fatigue, headache, sore throat, and diarrhea  Date COVID symptoms began: 10/29/23    4:46 pm - Warm tx'd to [facility] for Virtual Visit for Covid treatment    Daniela Tolbert RN  [facility]      Reason for Disposition   HIGH RISK patient (e.g., weak immune system, age > 64 years, obesity with BMI of 30 or higher, pregnant, chronic lung disease or other chronic medical condition) and COVID symptoms (e.g., cough, fever)  (Exceptions: Already seen by doctor or NP/PA and no new or worsening symptoms.)    Additional Information   Negative: SEVERE difficulty breathing (e.g., struggling for each breath, speaks in single words)   Negative: Difficult to awaken or acting confused (e.g., disoriented, slurred speech)   Negative: Bluish (or gray) lips or face now   Negative: Shock suspected (e.g., cold/pale/clammy skin, too weak to stand, low BP, rapid pulse)   Negative: Sounds like a life-threatening emergency to the triager   Negative: SEVERE or constant chest pain or pressure  (Exception: Mild central chest pain, present only when coughing.)   Negative: MODERATE difficulty breathing (e.g., speaks in phrases, SOB even at rest, pulse 100-120)   Negative: Headache and stiff neck (can't touch chin to chest)   Negative: Oxygen level (e.g., pulse oximetry) 90% or lower   Negative: Chest pain or pressure  (Exception: MILD central chest  pain, present only when coughing.)   Negative: Drinking very little and dehydration suspected (e.g., no urine > 12 hours, very dry mouth, very lightheaded)   Negative: Patient sounds very sick or weak to the triager   Negative: MILD difficulty breathing (e.g., minimal/no SOB at rest, SOB with walking, pulse <100)   Negative: Fever > 103 F (39.4 C)   Negative: Fever > 101 F (38.3 C) and over 60 years of age   Negative: Fever > 100.0 F (37.8 C) and bedridden (e.g., CVA, chronic illness, recovering from surgery)    Protocols used: Coronavirus (COVID-19) Diagnosed or [id number]

## 2023-10-31 ENCOUNTER — VIRTUAL VISIT (OUTPATIENT)
Dept: FAMILY MEDICINE | Facility: CLINIC | Age: 43
End: 2023-10-31
Payer: COMMERCIAL

## 2023-10-31 DIAGNOSIS — U07.1 INFECTION DUE TO 2019 NOVEL CORONAVIRUS: Primary | ICD-10-CM

## 2023-10-31 PROCEDURE — 99214 OFFICE O/P EST MOD 30 MIN: CPT | Mod: VID | Performed by: NURSE PRACTITIONER

## 2023-10-31 ASSESSMENT — PATIENT HEALTH QUESTIONNAIRE - PHQ9: SUM OF ALL RESPONSES TO PHQ QUESTIONS 1-9: 0

## 2023-10-31 NOTE — NURSING NOTE
"Chief Complaint   Patient presents with    Covid Concern       Initial LMP  (LMP Unknown)  Estimated body mass index is 37.12 kg/m  as calculated from the following:    Height as of 11/3/22: 1.676 m (5' 6\").    Weight as of 11/3/22: 104.3 kg (230 lb).    Patient presents to the clinic using No DME    Is there anyone who you would like to be able to receive your results? No  If yes have patient fill out ADAN      "

## 2023-10-31 NOTE — PROGRESS NOTES
Lauren is a 43 year old who is being evaluated via a billable video visit.      How would you like to obtain your AVS? MyChart  If the video visit is dropped, the invitation should be resent by: Text to cell phone: [phone]  Will anyone else be joining your video visit? No          Assessment & Plan     Infection due to 2019 novel coronavirus  Symptomatic care strategies reviewed  begin    - nirmatrelvir and ritonavir (PAXLOVID) 300 mg/100 mg therapy pack  Dispense: 30 tablet; Refill: 0    Call or return to the clinic with any worsening of symptoms or no resolution. Patient/Parent verbalized understanding and is in agreement. Medication side effects reviewed.   Current Outpatient Medications   Medication Sig Dispense Refill    IBUPROFEN PO Take 400 mg by mouth every 6 hours as needed for moderate pain      lamoTRIgine (LAMICTAL) 150 MG tablet       nirmatrelvir and ritonavir (PAXLOVID) 300 mg/100 mg therapy pack Take 3 tablets by mouth 2 times daily for 5 days 30 tablet 0    risperiDONE (RISPERDAL) 1 MG tablet Take 1 tablet (1 mg) by mouth 2 times daily      spironolactone (ALDACTONE) 25 MG tablet TAKE ONE TABLET BY MOUTH IN THE MORNING AND ONE IN THE EVENING. TAKE WITH A FULL GLASS OF WATER.      venlafaxine (EFFEXOR-XR) 150 MG 24 hr capsule       clindamycin-benzoyl peroxide (BENZACLIN) 1-5 % external gel Apply topically 2 times daily 50 g 1    ketoconazole (NIZORAL) 2 % external shampoo Twice weekly for 4 weeks then weekly. 120 mL 0    tretinoin (RETIN-A) 0.05 % external cream Apply topically At Bedtime 45 g 1     Chart documentation with Dragon Voice recognition Software. Although reviewed after completion, some words and grammatical errors may remain.  As the provider for this telephone/video service, I attest that I introduced myself to the patient, provided my credentials, disclosed my location, and determined that, based on a review of the patient's chart and/or a discussion with members of the patient's  treatment team, a telephone/video visit is an appropriate and effective means of providing this service. The patient and I mutually agree that this visit is appropriate for telephone/video visit as well.      ALEX Adkins CNP [facility]    Dipak Aguilar is a 43 year old, presenting for the following health issues:  Covid Concern      10/31/2023     7:39 AM   Additional Questions   Roomed by Ayse MURPHY   Accompanied by self       HPI       COVID-19 Symptom Review  How many days ago did these symptoms start? 2 days    Are any of the following symptoms significant for you?  New or worsening difficulty breathing? No  Worsening cough? Yes burning sensation when with cough  Fever or chills? no  Headache: YES  Sore throat: YES  Chest pain: No  Diarrhea: YES  Body aches? YES    What treatments has patient tried? Nonsteroidals   Does patient live in a nursing home, group home, or shelter? YES  Does patient have a way to get food/medications during quarantined? Yes, I have a friend or family member who can help me.                Review of Systems   Constitutional, HEENT, cardiovascular, pulmonary, GI, , musculoskeletal, neuro, skin, endocrine and psych systems are negative, except as otherwise noted.      Objective           Vitals:  No vitals were obtained today due to virtual visit.    Physical Exam   GENERAL: alert and no distress  EYES: Eyes grossly normal to inspection.  No discharge or erythema, or obvious scleral/conjunctival abnormalities.  RESP: No audible wheeze, cough, or visible cyanosis.  No visible retractions or increased work of breathing.    SKIN: Visible skin clear. No significant rash, abnormal pigmentation or lesions.  NEURO: Cranial nerves grossly intact.  Mentation and speech appropriate for age.  PSYCH: Mentation appears normal, affect normal/bright, judgement and insight intact, normal speech and appearance well-groomed.    Lab on 10/17/2023   Component  Date Value Ref Range Status    Hemoglobin A1C 10/17/2023 5.7 (H)  0.0 - 5.6 % Final    Normal <5.7%   Prediabetes 5.7-6.4%    Diabetes 6.5% or higher     Note: Adopted from ADA consensus guidelines.    Cholesterol 10/17/2023 193  <200 mg/dL Final    Triglycerides 10/17/2023 136  <150 mg/dL Final    Direct Measure HDL 10/17/2023 40 (L)  >=50 mg/dL Final    LDL Cholesterol Calculated 10/17/2023 126 (H)  <=100 mg/dL Final    Non HDL Cholesterol 10/17/2023 153 (H)  <130 mg/dL Final    TSH 10/17/2023 3.21  0.30 - 4.20 uIU/mL Final    Vitamin D, Total (25-Hydroxy) 10/17/2023 29  20 - 50 ng/mL Final    optimum levels    Protein Total 10/17/2023 7.4  6.4 - 8.3 g/dL Final    Albumin 10/17/2023 4.4  3.5 - 5.2 g/dL Final    Bilirubin Total 10/17/2023 0.3  <=1.2 mg/dL Final    Alkaline Phosphatase 10/17/2023 115 (H)  35 - 104 U/L Final    AST 10/17/2023 22  0 - 45 U/L Final    Reference intervals for this test were updated on 6/12/2023 to more accurately reflect our healthy population. There may be differences in the flagging of prior results with similar values performed with this method. Interpretation of those prior results can be made in the context of the updated reference intervals.    ALT 10/17/2023 20  0 - 50 U/L Final    Reference intervals for this test were updated on 6/12/2023 to more accurately reflect our healthy population. There may be differences in the flagging of prior results with similar values performed with this method. Interpretation of those prior results can be made in the context of the updated reference intervals.      Bilirubin Direct 10/17/2023 <0.20  0.00 - 0.30 mg/dL Final    Sodium 10/17/2023 140  135 - 145 mmol/L Final    Reference intervals for this test were updated on 09/26/2023 to more accurately reflect our healthy population. There may be differences in the flagging of prior results with similar values performed with this method. Interpretation of those prior results can be made in the  context of the updated reference intervals.     Potassium 10/17/2023 3.8  3.4 - 5.3 mmol/L Final    Chloride 10/17/2023 102  98 - 107 mmol/L Final    Carbon Dioxide (CO2) 10/17/2023 25  22 - 29 mmol/L Final    Anion Gap 10/17/2023 13  7 - 15 mmol/L Final    Urea Nitrogen 10/17/2023 11.6  6.0 - 20.0 mg/dL Final    Creatinine 10/17/2023 0.85  0.51 - 0.95 mg/dL Final    GFR Estimate 10/17/2023 87  >60 mL/min/1.73m2 Final    Calcium 10/17/2023 9.4  8.6 - 10.0 mg/dL Final    Glucose 10/17/2023 89  70 - 99 mg/dL Final    WBC Count 10/17/2023 10.6  4.0 - 11.0 10e3/uL Final    RBC Count 10/17/2023 4.72  3.80 - 5.20 10e6/uL Final    Hemoglobin 10/17/2023 13.2  11.7 - 15.7 g/dL Final    Hematocrit 10/17/2023 40.1  35.0 - 47.0 % Final    MCV 10/17/2023 85  78 - 100 fL Final    MCH 10/17/2023 28.0  26.5 - 33.0 pg Final    MCHC 10/17/2023 32.9  31.5 - 36.5 g/dL Final    RDW 10/17/2023 12.0  10.0 - 15.0 % Final    Platelet Count 10/17/2023 277  150 - 450 10e3/uL Final    % Neutrophils 10/17/2023 66  % Final    % Lymphocytes 10/17/2023 28  % Final    % Monocytes 10/17/2023 4  % Final    % Eosinophils 10/17/2023 2  % Final    % Basophils 10/17/2023 1  % Final    % Immature Granulocytes 10/17/2023 0  % Final    Absolute Neutrophils 10/17/2023 7.0  1.6 - 8.3 10e3/uL Final    Absolute Lymphocytes 10/17/2023 3.0  0.8 - 5.3 10e3/uL Final    Absolute Monocytes 10/17/2023 0.4  0.0 - 1.3 10e3/uL Final    Absolute Eosinophils 10/17/2023 0.2  0.0 - 0.7 10e3/uL Final    Absolute Basophils 10/17/2023 0.1  0.0 - 0.2 10e3/uL Final    Absolute Immature Granulocytes 10/17/2023 0.0  <=0.4 10e3/uL Final               Video-Visit Details    Type of service:  Video Visit     Originating Location (pt. Location): Home    Distant Location (provider location):  Off-site  Platform used for Video Visit: Gary

## 2023-10-31 NOTE — PATIENT INSTRUCTIONS
For informational purposes only. Not to replace the advice of your health care provider. Copyright   2022 [facility]. All rights reserved. Clinically reviewed by Loretta Haddad, PharmD, BCACP. [employer] [id number] - REV 06/23.  COVID-19 Outpatient Treatments  Your care team can help you find the best treatments for COVID-19. Talk to a health care provider or refer to the FDA medicine fact sheets below.  Paxlovid (nirmatrelvir and ritonavir): https://www.paxlovid."[employer]/resources  Molnupiravir (Lagevrio): https://www.fda.gov/media/637409/download  Important: We can only prescribe Paxlovid or Molnupiravir when it can be started within 5 days of first having symptoms.  Paxlovid (nimatrelvir and ritonavir)  How it works  Two medicines (nirmatrelvir and ritonavir) are taken together. They stop the virus from growing. Less amount of virus is easier for your body to fight.  Benefits  Lowers risk of a hospital stay or death from COVID-19.  How to take  Medicine comes in a daily container with both medicine tablets. Take by mouth twice daily (once in the morning, once at night) for 5 days.  The number of tablets to take varies by patient.  Don't chew or break capsules. Swallow whole.  When to take  Take it as soon as possible and within 5 days of your first symptoms.  Who can take it  Patients must be 12 years or older weigh at least 88 pounds. Paxlovid is the preferred treatment for pregnant patients.  Possible side effects  Can cause altered sense of taste, diarrhea (loose, watery stools), high blood pressure, muscle aches.  Medicine conflicts  Some medicines may conflict with Paxlovid and may cause serious side effects.  Tell your care team about all the medicines you take, including prescription and over-the-counter medicines, vitamins, and herbal supplements.  Your care team will review your medicines to make sure that you can safely take Paxlovid.  Cautions  Paxlovid is not advised for patients with severe  kidney or liver disease. If you have kidney or liver problems, the dose may need to be changed.  If you're pregnant or breastfeeding, talk to your care team about your options.  If you take hormonal birth control (such as the Pill), then you or your partner should also use a non-hormonal form of birth control (such as a condom). Keep doing this for 1 menstrual cycle after your last dose of Paxlovid.      Coping with Life After COVID-19  Being in the hospital because of COVID-19 is scary. Going home can be scary, too. You may face changes to your life, the way you work or what you can eat. It s hard to adjust to change, and it s normal to feel afraid, frustrated or even angry. These feelings usually go away over time. If your feelings don t start to get better, it s called  adjustment disorder.      What signs should I look out for?  Adjustment disorder can happen to anyone in a time of stress. It makes it hard to cope with daily life. You may feel lonely or fight with loved ones, even if you re glad to be home. Watch for these signs:  Fear or worry  Hard time focusing  Sadness or anger  Trouble talking to family or friends  Feeling like you don t fit in or isolating yourself  Problems with sleep   Drinking alcohol or taking drugs to cope    What can I do?  You can help yourself get better. Feeling you have control helps you move forward. You may wonder if you ll be able to do things you did before. Be patient. Do your best to make the most of every day. Try to build relationships, be as active as you can, eat right and keep a sense of humor. Avoid smoking and drinking too much alcohol. Call your family doctor or clinic if you re not sure what to do. They can guide you to care or other services.    When should I get help?  Think about getting counseling if your sadness or frustration gets worse. Together with a trained counselor, you can talk about your problems adjusting to life after your hospital stay. You can  come up with new ways to handle changes that give you more control. Your family doctor or care team can help you find a counselor.     Get help if you re thinking about hurting yourself. If you need help right away, call 911 or go to the nearest emergency room. You can also try the Crisis Text Line.    Crisis Text Line: 783-209 (http://www.crisistextline.org)  The Crisis Text Line serves anyone, in any crisis. It gives free, 24/7 support. Here's how it works:  Text HOME to 165329 from anywhere in the [place], anytime, about any type of crisis.  A live, trained Crisis Counselor will text you back quickly.  The volunteer Crisis Counselor can help you move from a  hot moment  to a  cool moment.  They can also help you work out a safety plan.

## 2024-01-22 ENCOUNTER — OFFICE VISIT (OUTPATIENT)
Dept: URGENT CARE | Facility: URGENT CARE | Age: 44
End: 2024-01-22
Payer: COMMERCIAL

## 2024-01-22 VITALS
OXYGEN SATURATION: 96 % | HEART RATE: 90 BPM | TEMPERATURE: 97.5 F | HEIGHT: 66 IN | DIASTOLIC BLOOD PRESSURE: 70 MMHG | WEIGHT: 220 LBS | SYSTOLIC BLOOD PRESSURE: 106 MMHG | RESPIRATION RATE: 18 BRPM | BODY MASS INDEX: 35.36 KG/M2

## 2024-01-22 DIAGNOSIS — R07.0 THROAT PAIN: Primary | ICD-10-CM

## 2024-01-22 LAB
DEPRECATED S PYO AG THROAT QL EIA: NEGATIVE
GROUP A STREP BY PCR: NOT DETECTED

## 2024-01-22 PROCEDURE — 99212 OFFICE O/P EST SF 10 MIN: CPT | Performed by: PHYSICIAN ASSISTANT

## 2024-01-22 PROCEDURE — 87651 STREP A DNA AMP PROBE: CPT | Performed by: PHYSICIAN ASSISTANT

## 2024-01-22 NOTE — PROGRESS NOTES
"SUBJECTIVE:  Lauren Callaway is a 44 year old female with a chief complaint of sore throat.  Onset of symptoms was 2 day(s) ago.    Course of illness: still present.  Severity moderate  Current and Associated symptoms: sore throat  Treatment measures tried include Tylenol/Ibuprofen.  Predisposing factors include None.    Past Medical History:   Diagnosis Date    ADHD (attention deficit hyperactivity disorder)     Gallstones     status post cholecystectomy 2014     Current Outpatient Medications   Medication Sig Dispense Refill    lamoTRIgine (LAMICTAL) 150 MG tablet       risperiDONE (RISPERDAL) 1 MG tablet Take 1 tablet (1 mg) by mouth 2 times daily      spironolactone (ALDACTONE) 25 MG tablet TAKE ONE TABLET BY MOUTH IN THE MORNING AND ONE IN THE EVENING. TAKE WITH A FULL GLASS OF WATER.      venlafaxine (EFFEXOR-XR) 150 MG 24 hr capsule       clindamycin-benzoyl peroxide (BENZACLIN) 1-5 % external gel Apply topically 2 times daily 50 g 1    IBUPROFEN PO Take 400 mg by mouth every 6 hours as needed for moderate pain      ketoconazole (NIZORAL) 2 % external shampoo Twice weekly for 4 weeks then weekly. 120 mL 0    tretinoin (RETIN-A) 0.05 % external cream Apply topically At Bedtime 45 g 1     Social History     Tobacco Use    Smoking status: Former    Smokeless tobacco: Never    Tobacco comments:     quit 3 years ago   Substance Use Topics    Alcohol use: Yes     Comment: social       ROS:  Review of systems negative except as stated above.    OBJECTIVE:   /70   Pulse 90   Temp 97.5  F (36.4  C) (Temporal)   Resp 18   Ht 1.676 m (5' 6\")   Wt 99.8 kg (220 lb)   SpO2 96%   BMI 35.51 kg/m    GENERAL APPEARANCE: healthy, alert and no distress  EYES: conjunctiva clear  HENT: ear canals and TM's normal.  Nose normal.  Pharynx erythematous with uvula midline.  NECK: supple, non-tender to palpation, no adenopathy noted  RESP: lungs clear to auscultation - no rales, rhonchi or wheezes  CV: regular rates and " rhythm, normal S1 S2, no murmur noted  SKIN: no suspicious lesions or rashes    Results for orders placed or performed in visit on 01/22/24   Streptococcus A Rapid Screen w/Reflex to PCR - Clinic Collect     Status: Normal    Specimen: Throat; Swab   Result Value Ref Range    Group A Strep antigen Negative Negative         ASSESSMENT:  (R07.0) Throat pain  (primary encounter diagnosis)  Comment: likely viral  Plan: Streptococcus A Rapid Screen w/Reflex to PCR -         Clinic Collect, Group A Streptococcus PCR         Throat Swab      Follow up with PCP if symptoms worsen or fail to improve

## 2024-02-10 ENCOUNTER — HEALTH MAINTENANCE LETTER (OUTPATIENT)
Age: 44
End: 2024-02-10

## 2024-02-14 ENCOUNTER — OFFICE VISIT (OUTPATIENT)
Dept: FAMILY MEDICINE | Facility: CLINIC | Age: 44
End: 2024-02-14
Payer: COMMERCIAL

## 2024-02-14 VITALS
WEIGHT: 234.8 LBS | SYSTOLIC BLOOD PRESSURE: 122 MMHG | BODY MASS INDEX: 40.08 KG/M2 | RESPIRATION RATE: 16 BRPM | OXYGEN SATURATION: 97 % | HEART RATE: 85 BPM | TEMPERATURE: 97.8 F | DIASTOLIC BLOOD PRESSURE: 84 MMHG | HEIGHT: 64 IN

## 2024-02-14 DIAGNOSIS — E66.01 MORBID OBESITY (H): ICD-10-CM

## 2024-02-14 DIAGNOSIS — R73.03 PREDIABETES: ICD-10-CM

## 2024-02-14 DIAGNOSIS — M25.562 CHRONIC PAIN OF LEFT KNEE: ICD-10-CM

## 2024-02-14 DIAGNOSIS — G89.29 CHRONIC PAIN OF LEFT KNEE: ICD-10-CM

## 2024-02-14 DIAGNOSIS — Z00.00 ROUTINE GENERAL MEDICAL EXAMINATION AT A HEALTH CARE FACILITY: Primary | ICD-10-CM

## 2024-02-14 DIAGNOSIS — Z23 ENCOUNTER FOR IMMUNIZATION: ICD-10-CM

## 2024-02-14 DIAGNOSIS — L65.9 HAIR LOSS: ICD-10-CM

## 2024-02-14 PROBLEM — F41.8 MIXED ANXIETY AND DEPRESSIVE DISORDER: Status: ACTIVE | Noted: 2024-02-14

## 2024-02-14 PROCEDURE — 90480 ADMN SARSCOV2 VAC 1/ONLY CMP: CPT | Performed by: PHYSICIAN ASSISTANT

## 2024-02-14 PROCEDURE — 90715 TDAP VACCINE 7 YRS/> IM: CPT | Performed by: PHYSICIAN ASSISTANT

## 2024-02-14 PROCEDURE — 91320 SARSCV2 VAC 30MCG TRS-SUC IM: CPT | Performed by: PHYSICIAN ASSISTANT

## 2024-02-14 PROCEDURE — 90471 IMMUNIZATION ADMIN: CPT | Performed by: PHYSICIAN ASSISTANT

## 2024-02-14 PROCEDURE — 99214 OFFICE O/P EST MOD 30 MIN: CPT | Mod: 25 | Performed by: PHYSICIAN ASSISTANT

## 2024-02-14 PROCEDURE — 99396 PREV VISIT EST AGE 40-64: CPT | Mod: 25 | Performed by: PHYSICIAN ASSISTANT

## 2024-02-14 RX ORDER — LEVONORGESTREL 52 MG/1
INTRAUTERINE DEVICE INTRAUTERINE
COMMUNITY

## 2024-02-14 SDOH — HEALTH STABILITY: PHYSICAL HEALTH: ON AVERAGE, HOW MANY DAYS PER WEEK DO YOU ENGAGE IN MODERATE TO STRENUOUS EXERCISE (LIKE A BRISK WALK)?: 3 DAYS

## 2024-02-14 SDOH — HEALTH STABILITY: PHYSICAL HEALTH: ON AVERAGE, HOW MANY MINUTES DO YOU ENGAGE IN EXERCISE AT THIS LEVEL?: 20 MIN

## 2024-02-14 ASSESSMENT — PAIN SCALES - GENERAL: PAINLEVEL: MODERATE PAIN (4)

## 2024-02-14 ASSESSMENT — SOCIAL DETERMINANTS OF HEALTH (SDOH): HOW OFTEN DO YOU GET TOGETHER WITH FRIENDS OR RELATIVES?: NEVER

## 2024-02-14 NOTE — PROGRESS NOTES
"Preventive Care Visit  [facility]  Tara Bautista PA-C, Physician Assistant - Medical  Feb 14, 2024    Assessment & Plan     Routine general medical examination at a health care facility  - REVIEW OF HEALTH MAINTENANCE PROTOCOL ORDERS  - CBC with platelets  - Basic metabolic panel  - Lipid panel reflex to direct LDL Non-fasting    Morbid obesity (H) - discussed options, she is open to semaglutide, sill see if PA is approved. If not, she would be open to phentermine with goodrx. Will follow up in 3 months after starting which ever agent.  - Semaglutide-Weight Management (WEGOVY) 0.5 MG/0.5ML pen  Dispense: 2 mL; Refill: 0  - Semaglutide-Weight Management (WEGOVY) 1 MG/0.5ML pen  Dispense: 2 mL; Refill: 0  - Semaglutide-Weight Management (WEGOVY) 0.25 MG/0.5ML pen  Dispense: 2 mL; Refill: 0  - PRIMARY CARE FOLLOW-UP SCHEDULING    Hair loss - will check TSH  - TSH with free T4 reflex    Prediabetes    Chronic pain of left knee - recommend PT for management, referral provided  - Physical Therapy Referral    Encounter for immunization  - COVID-19 12+ (2023-24) (PFIZER)  - TDAP 10-64Y (ADACEL,BOOSTRIX)      BMI  Estimated body mass index is 40.18 kg/m  as calculated from the following:    Height as of this encounter: 1.628 m (5' 4.1\").    Weight as of this encounter: 106.5 kg (234 lb 12.8 oz).     Counseling  Appropriate preventive services were discussed with this patient, including applicable screening as appropriate for fall prevention, nutrition, physical activity, Tobacco-use cessation, weight loss and cognition.  Checklist reviewing preventive services available has been given to the patient.  Reviewed patient's diet, addressing concerns and/or questions.   She is at risk for lack of exercise and has been provided with information to increase physical activity for the benefit of her well-being.   Patient is at risk for social isolation and has been provided with information about the " benefit of social connection.   She is at risk for psychosocial distress and has been provided with information to reduce risk.         Subjective   Lauren is a 44 year old, presenting for the following:  Physical        2/14/2024     4:17 PM   Additional Questions   Roomed by Delmi NORTON   Accompanied by self      Lauren here tdoay for [facility]    #Left knee pain  Injured knee in yoga about 1.5 years ago - dislocated kneecap while kneeling  Having pain with certain positions - sitting to standing, bending leg with leg tucked under her hurts    #weight  Weight got out of control when she had really significant depression  Mental health is much better now - follows with psychiatry  Did recently sign up for an exercise program for HIIT workouts    Health Care Directive  Patient does not have a Health Care Directive or Living Will:     HPI        2/14/2024   General Health   How would you rate your overall physical health? (!) FAIR   Feel stress (tense, anxious, or unable to sleep) Only a little   (!) STRESS CONCERN      2/14/2024   Nutrition   Three or more servings of calcium each day? Yes   Diet: Regular (no restrictions)   How many servings of fruit and vegetables per day? (!) 2-3   How many sweetened beverages each day? 0-1         2/14/2024   Exercise   Days per week of moderate/strenous exercise 3 days   Average minutes spent exercising at this level 20 min         2/14/2024   Social Factors   Frequency of gathering with friends or relatives Never   Worry food won't last until get money to buy more No   Food not last or not have enough money for food? No   Do you have housing?  Yes   Are you worried about losing your housing? No   Lack of transportation? No   Unable to get utilities (heat,electricity)? No   (!) SOCIAL CONNECTIONS CONCERN      2/14/2024   Dental   Dentist two times every year? Yes          No data to display                Today's PHQ-9 Score:       10/31/2023     7:43 AM   PHQ-9 SCORE   PHQ-9 Total Score 0  "          2/14/2024   Substance Use   Alcohol more than 3/day or more than 7/wk Not Applicable   Do you use any other substances recreationally? (!) DECLINE     Social History     Tobacco Use    Smoking status: Former    Smokeless tobacco: Never    Tobacco comments:     quit 3 years ago   Vaping Use    Vaping Use: Never used   Substance Use Topics    Alcohol use: Yes     Comment: social    Drug use: No             2/14/2024   Breast Cancer Screening   Family history of breast, colon, or ovarian cancer? No / Unknown          No data to display               Mammogram Screening - Mammogram every 1-2 years updated in Health Maintenance based on mutual decision making        2/14/2024   STI Screening   New sexual partner(s) since last STI/HIV test? No     History of abnormal Pap smear: NO - age 30-65 PAP every 5 years with negative HPV co-testing recommended        Latest Ref Rng & Units 7/7/2020     9:35 AM 7/7/2020     9:27 AM 6/16/2014    12:00 AM   PAP / HPV   PAP (Historical)   NIL  NIL    HPV 16 DNA NEG^Negative Negative      HPV 18 DNA NEG^Negative Negative      Other HR HPV NEG^Negative Negative        The 10-year ASCVD risk score (Kenneth RYAN, et al., 2019) is: 1.1%    Values used to calculate the score:      Age: 44 years      Sex: Female      Is Non- : No      Diabetic: No      Tobacco smoker: No      Systolic Blood Pressure: 122 mmHg      Is BP treated: No      HDL Cholesterol: 40 mg/dL      Total Cholesterol: 193 mg/dL        2/14/2024   Contraception/Family Planning   Questions about contraception or family planning No        Reviewed and updated as needed this visit by Provider     Meds  Problems                  Objective    Exam  /84 (BP Location: Right arm, Patient Position: Sitting, Cuff Size: Adult Large)   Pulse 85   Temp 97.8  F (36.6  C) (Temporal)   Resp 16   Ht 1.628 m (5' 4.1\")   Wt 106.5 kg (234 lb 12.8 oz)   LMP  (LMP Unknown)   SpO2 97%   BMI 40.18 kg/m  " "   Estimated body mass index is 40.18 kg/m  as calculated from the following:    Height as of this encounter: 1.628 m (5' 4.1\").    Weight as of this encounter: 106.5 kg (234 lb 12.8 oz).    Physical Exam  GENERAL: alert and no distress  EYES: Eyes grossly normal to inspection, PERRL and conjunctivae and sclerae normal  HENT: ear canals and TM's normal, nose and mouth without ulcers or lesions  NECK: no adenopathy, no asymmetry, masses, or scars  RESP: lungs clear to auscultation - no rales, rhonchi or wheezes  BREAST: normal without masses, tenderness or nipple discharge and no palpable axillary masses or adenopathy  CV: regular rate and rhythm, normal S1 S2, no S3 or S4, no murmur, click or rub, no peripheral edema  ABDOMEN: soft, nontender, no hepatosplenomegaly, no masses and bowel sounds normal  MS: no gross musculoskeletal defects noted, no edema  SKIN: no suspicious lesions or rashes  NEURO: Normal strength and tone, mentation intact and speech normal  PSYCH: mentation appears normal, affect normal/bright      Signed Electronically by: Tara Bautista PA-C    "

## 2024-02-14 NOTE — NURSING NOTE
Prior to immunization administration, verified patients identity using patient s name and date of birth. Please see Immunization Activity for additional information.     Screening Questionnaire for Adult Immunization    Are you sick today?   No   Do you have allergies to medications, food, a vaccine component or latex?   No   Have you ever had a serious reaction after receiving a vaccination?   No   Do you have a long-term health problem with heart, lung, kidney, or metabolic disease (e.g., diabetes), asthma, a blood disorder, no spleen, complement component deficiency, a cochlear implant, or a spinal fluid leak?  Are you on long-term aspirin therapy?   No   Do you have cancer, leukemia, HIV/AIDS, or any other immune system problem?   No   Do you have a parent, brother, or sister with an immune system problem?   No   In the past 3 months, have you taken medications that affect  your immune system, such as prednisone, other steroids, or anticancer drugs; drugs for the treatment of rheumatoid arthritis, Crohn s disease, or psoriasis; or have you had radiation treatments?   No   Have you had a seizure, or a brain or other nervous system problem?   No   During the past year, have you received a transfusion of blood or blood    products, or been given immune (gamma) globulin or antiviral drug?   No   For women: Are you pregnant or is there a chance you could become       pregnant during the next month?   No   Have you received any vaccinations in the past 4 weeks?   No     Immunization questionnaire answers were all negative.      Patient instructed to remain in clinic for 15 minutes afterwards, and to report any adverse reactions.     Screening performed by Delmi Cordoba MA on 2/14/2024 at 5:18 PM.       
no

## 2024-02-14 NOTE — PATIENT INSTRUCTIONS
Semaglutide dosing:  Week 1-4: 0.25mg weekly  Weeks 5-8: 0.5mg weekly  Weeks 9-12: 1mg weekly  Preventive Care Advice   This is general advice given by our system to help you stay healthy. However, your care team may have specific advice just for you. Please talk to your care team about your preventive care needs.  Nutrition  Eat 5 or more servings of fruits and vegetables each day.  Try wheat bread, brown rice and whole grain pasta (instead of white bread, rice, and pasta).  Get enough calcium and vitamin D. Check the label on foods and aim for 100% of the RDA (recommended daily allowance).  Lifestyle  Exercise at least 150 minutes each week  (30 minutes a day, 5 days a week).  Do muscle strengthening activities 2 days a week. These help control your weight and prevent disease.  No smoking.  Wear sunscreen to prevent skin cancer.  Have a dental exam and cleaning every 6 months.  Yearly exams  See your health care team every year to talk about:  Any changes in your health.  Any medicines your care team has prescribed.  Preventive care, family planning, and ways to prevent chronic diseases.  Shots (vaccines)   HPV shots (up to age 26), if you've never had them before.  Hepatitis B shots (up to age 59), if you've never had them before.  COVID-19 shot: Get this shot when it's due.  Flu shot: Get a flu shot every year.  Tetanus shot: Get a tetanus shot every 10 years.  Pneumococcal, hepatitis A, and RSV shots: Ask your care team if you need these based on your risk.  Shingles shot (for age 50 and up)  General health tests  Diabetes screening:  Starting at age 35, Get screened for diabetes at least every 3 years.  If you are younger than age 35, ask your care team if you should be screened for diabetes.  Cholesterol test: At age 39, start having a cholesterol test every 5 years, or more often if advised.  Bone density scan (DEXA): At age 50, ask your care team if you should have this scan for osteoporosis (brittle  bones).  Hepatitis C: Get tested at least once in your life.  STIs (sexually transmitted infections)  Before age 24: Ask your care team if you should be screened for STIs.  After age 24: Get screened for STIs if you're at risk. You are at risk for STIs (including HIV) if:  You are sexually active with more than one person.  You don't use condoms every time.  You or a partner was diagnosed with a sexually transmitted infection.  If you are at risk for HIV, ask about PrEP medicine to prevent HIV.  Get tested for HIV at least once in your life, whether you are at risk for HIV or not.  Cancer screening tests  Cervical cancer screening: If you have a cervix, begin getting regular cervical cancer screening tests starting at age 21.  Breast cancer scan (mammogram): If you've ever had breasts, begin having regular mammograms starting at age 40. This is a scan to check for breast cancer.  Colon cancer screening: It is important to start screening for colon cancer at age 45.  Have a colonoscopy test every 10 years (or more often if you're at risk) Or, ask your provider about stool tests like a FIT test every year or Cologuard test every 3 years.  To learn more about your testing options, visit:   https://www.[employer]/064486.pdf.  For help making a decision, visit:   https://bit.ly/to31660.  Prostate cancer screening test: If you have a prostate, ask your care team if a prostate cancer screening test (PSA) at age 55 is right for you.  Lung cancer screening: If you are a current or former smoker ages 50 to 80, ask your care team if ongoing lung cancer screenings are right for you.  For informational purposes only. Not to replace the advice of your health care provider. Copyright   2023 [employer]. All rights reserved. Clinically reviewed by the [facility] Transitions Program. Specle 342148 - REV 01/24.    Relationships for Good Health  Relationships are important for our health and happiness. Social  isolation, loneliness and lack of support are bad for your health. Studies show that loneliness can harm health and limit your life span as much as high blood pressure and smoking.   Take some time to reflect on your relationships. Then answer these questions:  Are there people in your life that cause you stress or drain your energy? What can you do to set limits?  ________________________________________________________________________________________________________________________________________________________________________________________________________________________________________________________________________________________________________________________________________________  Who do you enjoy spending time with? Who can you go to for support?  ________________________________________________________________________________________________________________________________________________________________________________________________________________________________________________________________________________________________________________________________________________  What can you do to improve your relationships with others?  __________________________________________________________________________________________________________________________________________________________________________________________________________________  ______________________________________________________________________________________________________________________________  What do you like most about your relationships with others?  ________________________________________________________________________________________________________________________________________________________________________________________________________________________________________________________________________________________________________________________________________________  My goal:  ______________________________________________________________________  I will ______________________________________________________________________________________________________________________________________________________________________________________________    For informational purposes only. Not to replace the advice of your health care provider. Copyright   2018 [facility]. All rights reserved. Clinically reviewed by [facility]  Team. [employer] 810595 - Rev 04/21.    Learning About Stress  What is stress?     Stress is your body's response to a hard situation. Your body can have a physical, emotional, or mental response. Stress is a fact of life for most people, and it affects everyone differently. What causes stress for you may not be stressful for someone else.  A lot of things can cause stress. You may feel stress when you go on a job interview, take a test, or run a race. This kind of short-term stress is normal and even useful. It can help you if you need to work hard or react quickly. For example, stress can help you finish an important job on time.  Long-term stress is caused by ongoing stressful situations or events. Examples of long-term stress include long-term health problems, ongoing problems at work, or conflicts in your family. Long-term stress can harm your health.  How does stress affect your health?  When you are stressed, your body responds as though you are in danger. It makes hormones that speed up your heart, make you breathe faster, and give you a burst of energy. This is called the fight-or-flight stress response. If the stress is over quickly, your body goes back to normal and no harm is done.  But if stress happens too often or lasts too long, it can have bad effects. Long-term stress can make you more likely to get sick, and it can make symptoms of some diseases worse. If you tense up when you are stressed, you may develop neck, shoulder, or low back  pain. Stress is linked to high blood pressure and heart disease.  Stress also harms your emotional health. It can make you sprague, tense, or depressed. Your relationships may suffer, and you may not do well at work or school.  What can you do to manage stress?  You can try these things to help manage stress:   Do something active. Exercise or activity can help reduce stress. Walking is a great way to get started. Even everyday activities such as housecleaning or yard work can help.  Try yoga or emelina chi. These techniques combine exercise and meditation. You may need some training at first to learn them.  Do something you enjoy. For example, listen to music or go to a movie. Practice your hobby or do volunteer work.  Meditate. This can help you relax, because you are not worrying about what happened before or what may happen in the future.  Do guided imagery. Imagine yourself in any setting that helps you feel calm. You can use online videos, books, or a teacher to guide you.  Do breathing exercises. For example:  From a standing position, bend forward from the waist with your knees slightly bent. Let your arms dangle close to the floor.  Breathe in slowly and deeply as you return to a standing position. Roll up slowly and lift your head last.  Hold your breath for just a few seconds in the standing position.  Breathe out slowly and bend forward from the waist.  Let your feelings out. Talk, laugh, cry, and express anger when you need to. Talking with supportive friends or family, a counselor, or a kristina leader about your feelings is a healthy way to relieve stress. Avoid discussing your feelings with people who make you feel worse.  Write. It may help to write about things that are bothering you. This helps you find out how much stress you feel and what is causing it. When you know this, you can find better ways to cope.  What can you do to prevent stress?  You might try some of these things to help prevent  "stress:  Manage your time. This helps you find time to do the things you want and need to do.  Get enough sleep. Your body recovers from the stresses of the day while you are sleeping.  Get support. Your family, friends, and community can make a difference in how you experience stress.  Limit your news feed. Avoid or limit time on social media or news that may make you feel stressed.  Do something active. Exercise or activity can help reduce stress. Walking is a great way to get started.  Where can you learn more?  Go to https://www.[employer].net/patiented  Enter N032 in the search box to learn more about \"Learning About Stress.\"  Current as of: February 26, 2023               Content Version: 13.8    7618-5478 3dim.   Care instructions adapted under license by your healthcare professional. If you have questions about a medical condition or this instruction, always ask your healthcare professional. [employer], [employer] disclaims any warranty or liability for your use of this information.      "

## 2024-02-16 ENCOUNTER — THERAPY VISIT (OUTPATIENT)
Dept: PHYSICAL THERAPY | Facility: CLINIC | Age: 44
End: 2024-02-16
Attending: PHYSICIAN ASSISTANT
Payer: COMMERCIAL

## 2024-02-16 DIAGNOSIS — G89.29 CHRONIC PAIN OF LEFT KNEE: ICD-10-CM

## 2024-02-16 DIAGNOSIS — M25.562 CHRONIC PAIN OF LEFT KNEE: ICD-10-CM

## 2024-02-16 PROBLEM — M67.88 ACHILLES TENDINOSIS OF RIGHT LOWER EXTREMITY: Status: RESOLVED | Noted: 2021-11-18 | Resolved: 2024-02-16

## 2024-02-16 PROBLEM — M79.671 PAIN OF RIGHT HEEL: Status: RESOLVED | Noted: 2021-11-18 | Resolved: 2024-02-16

## 2024-02-16 PROCEDURE — 97110 THERAPEUTIC EXERCISES: CPT | Mod: GP | Performed by: PHYSICAL THERAPIST

## 2024-02-16 PROCEDURE — 97161 PT EVAL LOW COMPLEX 20 MIN: CPT | Mod: GP | Performed by: PHYSICAL THERAPIST

## 2024-02-16 ASSESSMENT — ACTIVITIES OF DAILY LIVING (ADL)
HOW_WOULD_YOU_RATE_THE_OVERALL_FUNCTION_OF_YOUR_KNEE_DURING_YOUR_USUAL_DAILY_ACTIVITIES?: NEARLY NORMAL
SWELLING: I DO NOT HAVE THE SYMPTOM
RISE FROM A CHAIR: ACTIVITY IS FAIRLY DIFFICULT
STAND: ACTIVITY IS MINIMALLY DIFFICULT
PAIN: THE SYMPTOM AFFECTS MY ACTIVITY MODERATELY
KNEE_ACTIVITY_OF_DAILY_LIVING_SCORE: 62.86
SIT WITH YOUR KNEE BENT: ACTIVITY IS NOT DIFFICULT
WEAKNESS: THE SYMPTOM AFFECTS MY ACTIVITY MODERATELY
GO DOWN STAIRS: ACTIVITY IS SOMEWHAT DIFFICULT
RISE FROM A CHAIR: ACTIVITY IS FAIRLY DIFFICULT
HOW_WOULD_YOU_RATE_THE_CURRENT_FUNCTION_OF_YOUR_KNEE_DURING_YOUR_USUAL_DAILY_ACTIVITIES_ON_A_SCALE_FROM_0_TO_100_WITH_100_BEING_YOUR_LEVEL_OF_KNEE_FUNCTION_PRIOR_TO_YOUR_INJURY_AND_0_BEING_THE_INABILITY_TO_PERFORM_ANY_OF_YOUR_USUAL_DAILY_ACTIVITIES?: 75
AS_A_RESULT_OF_YOUR_KNEE_INJURY,_HOW_WOULD_YOU_RATE_YOUR_CURRENT_LEVEL_OF_DAILY_ACTIVITY?: NEARLY NORMAL
SIT WITH YOUR KNEE BENT: ACTIVITY IS NOT DIFFICULT
HOW_WOULD_YOU_RATE_THE_OVERALL_FUNCTION_OF_YOUR_KNEE_DURING_YOUR_USUAL_DAILY_ACTIVITIES?: NEARLY NORMAL
AS_A_RESULT_OF_YOUR_KNEE_INJURY,_HOW_WOULD_YOU_RATE_YOUR_CURRENT_LEVEL_OF_DAILY_ACTIVITY?: NEARLY NORMAL
STAND: ACTIVITY IS MINIMALLY DIFFICULT
GO DOWN STAIRS: ACTIVITY IS SOMEWHAT DIFFICULT
GO UP STAIRS: ACTIVITY IS SOMEWHAT DIFFICULT
KNEEL ON THE FRONT OF YOUR KNEE: I AM UNABLE TO DO THE ACTIVITY
SQUAT: ACTIVITY IS SOMEWHAT DIFFICULT
WALK: ACTIVITY IS SOMEWHAT DIFFICULT
SWELLING: I DO NOT HAVE THE SYMPTOM
SQUAT: ACTIVITY IS SOMEWHAT DIFFICULT
GIVING WAY, BUCKLING OR SHIFTING OF KNEE: THE SYMPTOM AFFECTS MY ACTIVITY MODERATELY
STIFFNESS: I DO NOT HAVE THE SYMPTOM
KNEEL ON THE FRONT OF YOUR KNEE: I AM UNABLE TO DO THE ACTIVITY
WALK: ACTIVITY IS SOMEWHAT DIFFICULT
STIFFNESS: I DO NOT HAVE THE SYMPTOM
GIVING WAY, BUCKLING OR SHIFTING OF KNEE: THE SYMPTOM AFFECTS MY ACTIVITY MODERATELY
PLEASE_INDICATE_YOR_PRIMARY_REASON_FOR_REFERRAL_TO_THERAPY:: KNEE
PAIN: THE SYMPTOM AFFECTS MY ACTIVITY MODERATELY
LIMPING: I DO NOT HAVE THE SYMPTOM
WEAKNESS: THE SYMPTOM AFFECTS MY ACTIVITY MODERATELY
RAW_SCORE: 44
HOW_WOULD_YOU_RATE_THE_CURRENT_FUNCTION_OF_YOUR_KNEE_DURING_YOUR_USUAL_DAILY_ACTIVITIES_ON_A_SCALE_FROM_0_TO_100_WITH_100_BEING_YOUR_LEVEL_OF_KNEE_FUNCTION_PRIOR_TO_YOUR_INJURY_AND_0_BEING_THE_INABILITY_TO_PERFORM_ANY_OF_YOUR_USUAL_DAILY_ACTIVITIES?: 75
GO UP STAIRS: ACTIVITY IS SOMEWHAT DIFFICULT
KNEE_ACTIVITY_OF_DAILY_LIVING_SUM: 44
LIMPING: I DO NOT HAVE THE SYMPTOM

## 2024-02-16 NOTE — PROGRESS NOTES
PHYSICAL THERAPY EVALUATION  Type of Visit: Evaluation    See electronic medical record for Abuse and Falls Screening details.    Subjective       Presenting condition or subjective complaint: Pt presents to PT with a chief complaint of L patellar pain with initial onset ~1.5 years ago when completing yoga. Pt reports having a L patellar subluxation in yoga and has had difficulty ever since. Pain worse with stairs, squatting, standing after prolonged sitting.  Date of onset: 02/14/24    Relevant medical history:     Dates & types of surgery:      Prior diagnostic imaging/testing results:       Prior therapy history for the same diagnosis, illness or injury: No      Prior Level of Function  Transfers:   Ambulation:   ADL:   IADL:     Living Environment  Social support: With a significant other or spouse   Type of home: House   Stairs to enter the home: Yes 3 Is there a railing: Yes   Ramp: No   Stairs inside the home: Yes 30 Is there a railing: Yes   Help at home: None  Equipment owned:       Employment: Yes   Hobbies/Interests:      Patient goals for therapy: squat, stairs, hiking    Pain assessment: See objective evaluation for additional pain details     Objective   KNEE EVALUATION  PAIN: Pain Level at Rest: 0/10  Pain Level with Use: 8/10  Pain Location: L lateral patella   Pain Quality: Sharp  Pain Frequency: intermittent  Pain is Worst: w/ activity  Pain is Exacerbated By: stairs, hiking, squatting  Pain is Relieved By: rest  Pain Progression: Unchanged  INTEGUMENTARY (edema, incisions):   POSTURE:   GAIT:  Weightbearing Status: WBAT  Assistive Device(s):   Gait Deviations:  L knee hyperextension; knee valgus  BALANCE/PROPRIOCEPTION: Single Leg Stance Eyes Open (seconds): 30 sec  WEIGHTBEARING ALIGNMENT:   NON-WEIGHTBEARING ALIGNMENT:   ROM: AROM WNL  PROM WNL  L knee hyperext 10 deg; R knee hyperext 5 deg    STRENGTH:   Pain: - none + mild ++ moderate +++ severe  Strength Scale: 0-5/5 Left  Right   Knee Flexion 5 5   Knee Extension 4+, ++ (mod) 5   Quad Set 5 5     FLEXIBILITY: Decreased IT band L  SPECIAL TESTS:  Positive patellar apprehension, pain ++ at L lateral patella  FUNCTIONAL TESTS: Step Test: pain +++ w/ 4 in step down at lateral patella  PALPATION:  TTP at L lateral patella  JOINT MOBILITY:     Assessment & Plan   CLINICAL IMPRESSIONS  Medical Diagnosis: L knee pain, patellar instability    Treatment Diagnosis: L knee pain and instability   Impression/Assessment: Patient is a 44 year old female with L knee complaints.  The following significant findings have been identified: Pain, Decreased ROM/flexibility, Decreased strength, Impaired gait, Impaired muscle performance, and Instability. These impairments interfere with their ability to perform recreational activities, household chores, household mobility, and community mobility as compared to previous level of function.     Clinical Decision Making (Complexity):  Clinical Presentation: Stable/Uncomplicated  Clinical Presentation Rationale: based on medical and personal factors listed in PT evaluation  Clinical Decision Making (Complexity): Low complexity    PLAN OF CARE  Treatment Interventions:  Interventions: Gait Training, Manual Therapy, Neuromuscular Re-education, Therapeutic Activity, Therapeutic Exercise    Long Term Goals     PT Goal 1  Goal Identifier: Stairs  Goal Description: Pt will be able to ascend/descend stairs reciprocally painfree  Rationale: to maximize safety and independence within the community;to maximize safety and independence within the home;to maximize safety and independence with transportation  Goal Progress: pain 8/10 at times  Target Date: 04/12/24      Frequency of Treatment: 2 x month  Duration of Treatment: 3 months    Recommended Referrals to Other Professionals:   Education Assessment:   Learner/Method: Patient;No Barriers to Learning    Risks and benefits of evaluation/treatment have been explained.    Patient/Family/caregiver agrees with Plan of Care.     Evaluation Time:     PT [place], Low Complexity Minutes (81070): 20       Signing Clinician: José Antonio Byrd PT

## 2024-03-13 ENCOUNTER — MYC MEDICAL ADVICE (OUTPATIENT)
Dept: FAMILY MEDICINE | Facility: CLINIC | Age: 44
End: 2024-03-13
Payer: COMMERCIAL

## 2024-05-14 ENCOUNTER — OFFICE VISIT (OUTPATIENT)
Dept: FAMILY MEDICINE | Facility: CLINIC | Age: 44
End: 2024-05-14
Attending: PHYSICIAN ASSISTANT
Payer: COMMERCIAL

## 2024-05-14 VITALS
BODY MASS INDEX: 39.56 KG/M2 | SYSTOLIC BLOOD PRESSURE: 124 MMHG | HEIGHT: 64 IN | TEMPERATURE: 97 F | OXYGEN SATURATION: 98 % | DIASTOLIC BLOOD PRESSURE: 82 MMHG | RESPIRATION RATE: 14 BRPM | HEART RATE: 82 BPM | WEIGHT: 231.7 LBS

## 2024-05-14 DIAGNOSIS — E66.01 MORBID OBESITY (H): Primary | ICD-10-CM

## 2024-05-14 DIAGNOSIS — N95.1 PERIMENOPAUSE: ICD-10-CM

## 2024-05-14 PROCEDURE — 99213 OFFICE O/P EST LOW 20 MIN: CPT | Performed by: PHYSICIAN ASSISTANT

## 2024-05-14 ASSESSMENT — PAIN SCALES - GENERAL: PAINLEVEL: NO PAIN (0)

## 2024-05-14 NOTE — PROGRESS NOTES
"  Assessment & Plan     Morbid obesity (H) - will dose increase up to 2.4mg. She will follow up with any issues otherwise yearly or if ready to decrease/stop.  - PRIMARY CARE FOLLOW-UP SCHEDULING  - Semaglutide-Weight Management (WEGOVY) 1.7 MG/0.75ML pen  Dispense: 3 mL; Refill: 0  - Semaglutide-Weight Management (WEGOVY) 2.4 MG/0.75ML pen  Dispense: 3 mL; Refill: 3    Perimenopause - reviewed possible symptoms, time course, possible treatments. She will continue to monitor for now.    BMI  Estimated body mass index is 39.77 kg/m  as calculated from the following:    Height as of this encounter: 1.626 m (5' 4\").    Weight as of this encounter: 105.1 kg (231 lb 11.2 oz).       The longitudinal plan of care for the diagnosis(es)/condition(s) as documented were addressed during this visit. Due to the added complexity in care, I will continue to support Lauren in the subsequent management and with ongoing continuity of care.      Dipak Aguilar is a 44 year old, presenting for the following health issues:  RECHECK        5/14/2024    10:30 AM   Additional Questions   Roomed by Yue PAULINO     Weight follow up  Noticing decreased appetite - continues to work on healthy diet, eating less, exercising more  No side effects  Started 1mg dose this week  Down 3lbs since last visit    Mirena inserted 2015 or 2016  Hair loss, acne, some night sweats, brain fog, mood changes    History of Present Illness       Reason for visit:  Follow Up post prescription    She eats 0-1 servings of fruits and vegetables daily.She consumes 0 sweetened beverage(s) daily.She exercises with enough effort to increase her heart rate 20 to 29 minutes per day.  She exercises with enough effort to increase her heart rate 4 days per week.   She is taking medications regularly.         Objective    /82 (BP Location: Right arm, Patient Position: Sitting, Cuff Size: Adult Large)   Pulse 82   Temp 97  F (36.1  C) (Temporal)   Resp 14   Ht 1.626 m " "(5' 4\")   Wt 105.1 kg (231 lb 11.2 oz)   SpO2 98%   BMI 39.77 kg/m    Body mass index is 39.77 kg/m .  Physical Exam   GENERAL: alert and no distress  PSYCH: mentation appears normal, affect normal/bright          Signed Electronically by: Tara Bautista PA-C    "

## 2024-07-08 ENCOUNTER — OFFICE VISIT (OUTPATIENT)
Dept: MIDWIFE SERVICES | Facility: CLINIC | Age: 44
End: 2024-07-08
Payer: COMMERCIAL

## 2024-07-08 VITALS
HEART RATE: 87 BPM | WEIGHT: 224 LBS | OXYGEN SATURATION: 99 % | SYSTOLIC BLOOD PRESSURE: 119 MMHG | DIASTOLIC BLOOD PRESSURE: 76 MMHG | BODY MASS INDEX: 38.45 KG/M2

## 2024-07-08 DIAGNOSIS — Z30.433 ENCOUNTER FOR REMOVAL AND REINSERTION OF INTRAUTERINE CONTRACEPTIVE DEVICE: Primary | ICD-10-CM

## 2024-07-08 PROCEDURE — 58301 REMOVE INTRAUTERINE DEVICE: CPT | Performed by: ADVANCED PRACTICE MIDWIFE

## 2024-07-08 PROCEDURE — 58300 INSERT INTRAUTERINE DEVICE: CPT | Performed by: ADVANCED PRACTICE MIDWIFE

## 2024-07-08 NOTE — PATIENT INSTRUCTIONS
Dear Lauren     It was nice to meet you today.    The perimenopause and menopause resources I mentioned were     A book Hot and Bothered by Khadijah Lopez  A podcast Jyoti Ray 'From PMS to Menopause: How to Hack Your Hormones'   The book The New Rules of Menopause; A [facility] Guide to Perimenopause and Beyond by  Dr Cynthia Edward       Overall good supplements to take are magnesium glycinate 400-500 mg per night, vitamin D 3 2876-6799 mg per day and fish oil 3070-1642 mg /day.        After having an IUD placed it is normal to have spotting and cramping, you can take Ibuprofen or Tylenol according to the instructions on the bottle and use a heating pad to the lower abdomen as needed.     Please avoid placing anything in the vagina (tampons, intercourse, etc) for 72 hours. The progesterone IUD takes 7 days to be effective for pregnancy prevention so please use condoms for back up contraception if you have intercourse before the 7 day kelsey but the copper (ParaGard IUD) is effective the same day of placement.     Please call [phone] with any severe abdominal pain, heavy vaginal bleeding, fevers or foul smelling vaginal discharge.     You should check your IUD strings in 2 weeks by placing one or two fingers inside the vagina as high up as you can reach, you should be able to feel the IUD strings (which feel like fishing line), if you can't feel your strings or don't feel comfortable checking yourself you can call clinic to schedule an IUD check.

## 2024-07-08 NOTE — PROGRESS NOTES
SUBJECTIVE:    Is a pregnancy test required: No.  Was a consent obtained?  Yes    Subjective: Lauren Callaway is a 44 year old  presents for IUD and desires Mirena  type IUD.  She requests removal of the IUD because the IUD effectiveness has     Patient has been given the opportunity to ask questions about all forms of birth control, including all options appropriate for Lauren Callaway. Discussed that no method of birth control, except abstinence is 100% effective against pregnancy or sexually transmitted infection.     Lauren Callaway understands she may have the IUD removed at any time. IUD should be removed by a health care provider and the current IUD will be removed today.    The entire removal and insertion procedure was reviewed with the patient, including care after placement.    Today's PHQ-2 Score:       2020     9:19 AM   PHQ-2 (  [employer])   Q1: Little interest or pleasure in doing things 1   Q2: Feeling down, depressed or hopeless 3   PHQ-2 Score 4   PHQ-2 Total Score (12-17 Years)- Positive if 3 or more points; Administer PHQ-A if positive 4       PROCEDURE:      A speculum exam was performed and the cervix was visualized. The IUD string was visualized. Using ring forceps, the string  was grasped and the IUD removed intact.    Under sterile technique, cervix was visualized with speculum and prepped with Betadine solution swab x 3. Tenaculum was placed for stability. The uterus was gently straightened and sounded to 7.5 cm. IUD prepared for placement, and IUD inserted according to 's instructions without difficulty or significant ressitance, and deployed at the fundus. The strings were visualized and trimmed to 3.0 cm from the external os. Tenaculum was removed and hemostasis noted. Speculum removed.  Patient tolerated procedure well.    EBL: minimal    Complications: none      POST PROCEDURE:    Given 's handouts, including when to have IUD removed, list of  danger s/sx, side effects and follow up recommended. Encouraged condom use for prevention of STD. Advised to call for any fever, for prolonged or severe pain or bleeding, abnormal vaginal dischage, or unable to palpate strings. She was advised to use pain medications (ibuprofen) as needed for mild to moderate pain. Advised to follow-up in clinic in 4-6 weeks for IUD string check if unable to find strings or as directed by provider.     ALEX Shukla CNM    Pt asking about perimenopause care.  Given resources and encouraged to make appt so we can discuss in further detail.      ALEX Shukla CNM

## 2024-07-30 ENCOUNTER — OFFICE VISIT (OUTPATIENT)
Dept: MIDWIFE SERVICES | Facility: CLINIC | Age: 44
End: 2024-07-30
Payer: COMMERCIAL

## 2024-07-30 VITALS
TEMPERATURE: 98.3 F | HEART RATE: 83 BPM | BODY MASS INDEX: 37.92 KG/M2 | OXYGEN SATURATION: 97 % | SYSTOLIC BLOOD PRESSURE: 128 MMHG | DIASTOLIC BLOOD PRESSURE: 86 MMHG | WEIGHT: 220.9 LBS

## 2024-07-30 DIAGNOSIS — N95.1 PERIMENOPAUSE: Primary | ICD-10-CM

## 2024-07-30 DIAGNOSIS — L65.9 HAIR LOSS: ICD-10-CM

## 2024-07-30 PROCEDURE — 99214 OFFICE O/P EST MOD 30 MIN: CPT | Performed by: ADVANCED PRACTICE MIDWIFE

## 2024-07-30 RX ORDER — LEVONORGESTREL/ETHIN.ESTRADIOL 0.1-0.02MG
1 TABLET ORAL DAILY
Qty: 84 TABLET | Refills: 3 | Status: SHIPPED | OUTPATIENT
Start: 2024-07-30

## 2024-07-30 NOTE — PATIENT INSTRUCTIONS
Dear Lauren     It was nice to meet you today.    The perimenopause and menopause resources I mentioned were     A book Hot and Bothered by Khadijah Lopez  A podcast Jyoti Ray 'From PMS to Menopause: How to Hack Your Hormones'   The book The New Rules of Menopause; A [facility] Guide to Perimenopause and Beyond by  Dr Cynthia Edward     The Stages of Reproductive Aging Workshop                     Hormones in the Female Menstrual Cycle       Hormone Levels on Combined Oral Contraceptives      Hormone Levels in the Years Before and After Menopause      Things to Know About Hormone Therapy     Menopause is a normal life event for women - it is not an illness or medical condition.  Every person's experience of menopause is different.  Some women have many symptoms that interfere with life, and others may not experience any charge other than their period stops.    Many women with symptoms often suffer in silence and do not realize how effective and safe hormone therapy can be to improving their symptoms and quality of life.  Most common symptoms  Hot flashes  Night Sweats  Painful sex and/or dry vagina    The most effective way to treat hot flashes, night sweats and painful dry vagina is with hormone therapy.      There are three categories of hormone therapy    If you are still having periods, and it is safe for you, a low dose birth control pill with both estrogen and progesterone can work very well to provide contraception and relieve symptoms.      Estrogen Therapy or ET  means estrogen only therapy.  Estrogen is the hormone that provides the most menopausal symptom relief.  It is a much lower dose than used in a low dose birth control pill.  Estrogen only therapy is only for women who had had their uterus removed with a surgery.    Estrogen Progesterone Therapy or EPT means taking both estrogen and progesterone.  The progesterone protects the uterus from developing uterine cancer from estrogen alone.  This can  be from taking a pill, having a Kyleena or Mirena IUD or using a combined product      There are two basic ways to use hormone therapy:    Systemic which means to circulate in the blood stream to all parts of the body.  This is given either as an oral tablet, a patch, a vaginal ring, or a vaginal cream.  Systemic hormones are used to treat hot flashes and night sweats     Local which means the product only affects a specific or localized area of the body.  This can be given as a cream, a ring or a tablet and is used to treat vaginal symptoms of menopause.  Less risk because the blood level of estrogen and progesterone is not increasing    May time for using systemic hormone therapy    You are having hot flashes and/or night sweats  You are within 10 years of menopause and under age 60    Benefits of using systemic hormone therapy may include    Effectively treats hot flashes, night sweats and vaginal dryness and pain  Helps prevent bone loss  May improve mood swings  May improve sex drive or low libido   Helps reduce risk of future cardiovascular disease, type 2 Diabetes, osteoarthritis, and dementia when used within 10 yrs of menopause     Risks of using systemic hormone therapy     Increased risk of developing a clot, rare, and less increase when using a patch  Increased risk of breast cancer, about the same increase in risks as having a glass or two of wine each night or being overweight.  When using Prometrium, the same progesterone hormone the ovary makes, there is less of an increase in risk     Side Effects     You may have vaginal bleeding again initially, if bleeding persists beyond 6 months please let us know so we can screen you for uterine cancer  Breast tenderness     For more information about Menopause    A book Hot and Bothered by Khadijah Lopez  A podcast Jyoti Ray 'From PMS to Menopause: How to Hack Your Hormones'   The book The New Rules of Menopause; A [facility] Guide to Perimenopause and  Beyond by Dr Cynthia Edward  The North American Menopause Society   The [facility] - Menopause       References:   Menopause Practice; A Clinician's Guide, 6th Edition, The North American Menopause Society   The New Rules of Menopause; A [facility] Guide to Perimenopause and Beyond, Cynthia Edward M.D., M.B.A., Director of [facility]     Compiled by Ilda JOHNSON CNM, MSCP (Menopause Society Certified Practitioner) 1/9/2024

## 2024-07-30 NOTE — PROGRESS NOTES
Lauren SHORT Nilton 44 year old No LMP recorded (lmp unknown). (Menstrual status: IUD).      CC: Perimenopause consult     HPI:  Pt presents with the following symptoms  - Hair thinning  - acne   - insomnia, wakes up multiple times and unable to fall back asleep, reports noted night sweats 4 x   - Hip pain  -Fatigue, trouble with motivation   -constipation  -vaginal dryness  -low libido     Pt had Mirena IUD replaced on 2024.  Medication hx    Current Outpatient Medications   Medication Sig Dispense Refill    ketoconazole (NIZORAL) 2 % external shampoo Twice weekly for 4 weeks then weekly. 120 mL 0    lamoTRIgine (LAMICTAL) 150 MG tablet       levonorgestrel (MIRENA) 52 MG (20 mcg/day) IUD 1 each by Intrauterine route once      levonorgestrel (MIRENA, 52 MG,) 52 MG (20 mcg/day) IUD Mirena 20 mcg/24 hours (8 yrs) 52 mg intrauterine device   Take 1 device by intrauterine route for 1825 days.      levonorgestrel-ethinyl estradiol (AVIANE) 0.1-20 MG-MCG tablet Take 1 tablet by mouth daily 84 tablet 3    minoxidil (ROGAINE) 2 % external solution Apply topically 2 times daily 60 mL 2    risperiDONE (RISPERDAL) 1 MG tablet Take 1 tablet (1 mg) by mouth 2 times daily      Semaglutide-Weight Management (WEGOVY) 0.25 MG/0.5ML pen Weeks 1-4: Inject 0.25mg subcutaneously once a week 2 mL 0    Semaglutide-Weight Management (WEGOVY) 0.5 MG/0.5ML pen Weeks 5-8: Inject 0.5mg subcutaneously once a week 2 mL 0    Semaglutide-Weight Management (WEGOVY) 1 MG/0.5ML pen Weeks 9-12: Inject 1mg subcutaneously once a week 2 mL 0    Semaglutide-Weight Management (WEGOVY) 1.7 MG/0.75ML pen Inject 1.7 mg Subcutaneous once a week Weeks 13-16 3 mL 0    Semaglutide-Weight Management (WEGOVY) 2.4 MG/0.75ML pen Inject 2.4 mg Subcutaneous once a week Weeks 17-20+ 3 mL 3    spironolactone (ALDACTONE) 25 MG tablet TAKE ONE TABLET BY MOUTH IN THE MORNING AND ONE IN THE EVENING. TAKE WITH A FULL GLASS OF WATER.      tretinoin (RETIN-A) 0.05 %  external cream Apply topically At Bedtime 45 g 1    venlafaxine (EFFEXOR-XR) 150 MG 24 hr capsule        No current facility-administered medications for this visit.      Pap 7/7/2020  WNL, neg HPV  Mammo 1/13/2023  Colon screening?    Denies migraine with aura, HTN. CA or clotting hx, denies smoking.    A/P:  (N95.1) Perimenopause  (primary encounter diagnosis)  Comment:   Plan: levonorgestrel-ethinyl estradiol (AVIANE)         0.1-20 MG-MCG tablet        Discussed in detail perimenopause and menopause, given resources and reviewed STRAW graph.  Discussed differences in low dose OCPs with a low acting progesterone vs MHT.  Reviewed the role of estrogen and progesterone.  Pt is still menstruating, (started bleeding through her previous IUD) and no contraindications so would recommend a low dose OCP at this time.      (L65.9) Hair loss  Comment:   Plan: minoxidil (ROGAINE) 2 % external solution        Discussed the hair growth cycle and pt may see more loss before the regrowth     RTC 12 weeks for a med check    40 minutes spent on the date of the encounter doing chart review, history and exam, documentation and further activities per the note

## 2024-09-16 ENCOUNTER — TELEPHONE (OUTPATIENT)
Dept: FAMILY MEDICINE | Facility: CLINIC | Age: 44
End: 2024-09-16

## 2024-09-16 NOTE — TELEPHONE ENCOUNTER
Prior Authorization Retail Medication Request    Medication/Dose: Semaglutide-Weight Management (WEGOVY) 2.4 MG/0.75ML pen   Diagnosis and ICD code (if different than what is on RX):    Morbid obesity (H) [E66.01]  - Primary          Insurance   Primary: [facility] OUT OF STATE   Insurance ID:   [id number]     Pharmacy Information (if different than what is on RX)  Name:    [facility] - [place], [place] - [address]     Phone:  [phone]   Fax:[phone]

## 2024-09-16 NOTE — LETTER
"      September 24, 2024    Regarding:  Lauren Callaway  [address]  [place] [facility] [place] [facility] [address]              To Whom It May Concern:  This letter serves as an appeal for coverage of Wegovy for the above patient.  She has sustained a weight loss of at least 5% during her use of this medication and should be allowed to continiue.     2/14/2024  4:19 PM 7/30/2024  1:56 PM   Vital Signs     Systolic 122  128   Diastolic 84  86   Pulse 85  83   Temperature 97.8  F (36.6  C)  98.3   Respirations 16     Weight (LB) 234 lb 12.8 oz  220 lb 14.4 oz   Height 5' 4.1\"     BMI (Calculated) 40.18     Pain Score 4 (Moderate)     O2 97 %  97%       Sincerely,        Tara Bautista PA-C          "

## 2024-09-16 NOTE — LETTER
October 1, 2024      Lauren Callaway  [address]  [place] [facility] [address]        To Whom It May Concern,     I am writing in regards to decision to decline to cover Wegovy 2.4mg weekly for my patient Lauren Callaway. Since starting the medication in early 2024, she has been following a reduced calorie diet and has been making behavioral changes including joining a new gym. In the first 3 months of medication use, she lost nearly 6% of her body weight. I request that Wegovy be authorized for her ongoing use.           Sincerely,        Tara Bautista PA-C

## 2024-09-16 NOTE — LETTER
[address]  [place] [address]  [phone]        September 20, 2024    Regarding:  Lauren Callaway  [address]  [facility] [address]              To Whom It May Concern:  This letter serves as an appeal for coverage of Wegovy for the above patient.  She has sustained a weight loss of at least 5% during her use of this medication and should be allowed to continiue.        Sincerely,        Tara Bautista PA-C/bhumi

## 2024-09-18 NOTE — TELEPHONE ENCOUNTER
PA Initiation    Medication: WEGOVY 2.4 MG/0.75ML SC SOAJ  Insurance Company: [employer] Non-Specialty PA's - Phone [phone] Fax [phone]  Pharmacy Filling the Rx: [facility] - [place], [place] - [facility]  Filling Pharmacy Phone: [phone]  Filling Pharmacy Fax: [phone]  Start Date: 9/18/2024

## 2024-09-20 NOTE — TELEPHONE ENCOUNTER
PRIOR AUTHORIZATION DENIED    Medication: WEGOVY 2.4 MG/0.75ML SC SOAJ  Insurance Company: [employer] Non-Specialty PA's - Phone [phone] Fax [phone]  Denial Date: 9/19/2024  Denial Reason(s):     Appeal Information: yes  Patient Notified: NO

## 2024-09-23 ENCOUNTER — MYC MEDICAL ADVICE (OUTPATIENT)
Dept: FAMILY MEDICINE | Facility: CLINIC | Age: 44
End: 2024-09-23
Payer: COMMERCIAL

## 2024-09-24 ENCOUNTER — ANCILLARY PROCEDURE (OUTPATIENT)
Dept: MAMMOGRAPHY | Facility: CLINIC | Age: 44
End: 2024-09-24
Payer: COMMERCIAL

## 2024-09-24 DIAGNOSIS — Z12.31 VISIT FOR SCREENING MAMMOGRAM: ICD-10-CM

## 2024-09-24 PROCEDURE — 77067 SCR MAMMO BI INCL CAD: CPT | Mod: TC | Performed by: RADIOLOGY

## 2024-09-24 PROCEDURE — 77063 BREAST TOMOSYNTHESIS BI: CPT | Mod: TC | Performed by: RADIOLOGY

## 2024-09-24 NOTE — TELEPHONE ENCOUNTER
Medication Appeal Initiation    Medication: WEGOVY 2.4 MG/0.75ML SC SOAJ  Appeal Start Date:  9/24/2024  Insurance Company: [employer]  Insurance Phone: [phone]  Insurance Fax: [phone]  Comments:    Faxed LMN, Denial letter along with Appeal form.

## 2024-09-25 NOTE — TELEPHONE ENCOUNTER
Liana calling from [employer]  Case # [id number]    The appeal is open and they are trying to approve it and need more clinical information  Letter was faxed over to clinic to [phone] on yesterday    Will route to prior auth team    (I did tell Liana information below)    Johana PENA RN, BSN  [facility]

## 2024-09-26 NOTE — TELEPHONE ENCOUNTER
Writer responded via [employer].  ZULEIKA MaradiagaN, RN-BC  [facility]

## 2024-09-27 NOTE — TELEPHONE ENCOUNTER
Called plan. Per rep this pa appeal has been denied. I'm waiting for the two letters to be faxed to me that they sent as we never received the letter faxed on 9/24

## 2024-09-30 NOTE — TELEPHONE ENCOUNTER
MEDICATION APPEAL DENIED    Medication: WEGOVY 2.4 MG/0.75ML SC SOAJ  Insurance Company: [employer]  Denial Date: 9/26/2024  Denial Reason(s):     Second Level Appeal Information: NA  Patient Notified: NO  Central Prior Authorization Team ONLY: Second level appeals will be managed by the clinic staff and provider. Please contact the [employer] Prior Authorization Team if additional information about the denial is needed.

## 2024-10-01 NOTE — TELEPHONE ENCOUNTER
Connected to patient who requested email  Advised [facility] does not communicate via email, but could send via MyC and she can scan back  Scanned to chart  Once signed copy is received it must be faxed-

## 2024-10-01 NOTE — TELEPHONE ENCOUNTER
Pt meets criteria with reduced calorie diet and behavioral changes, note from recent visit 5/14/24 addended with this info. Can you please update appeal?    Thanks,  Tara Bautista PA-C

## 2024-11-01 ENCOUNTER — VIRTUAL VISIT (OUTPATIENT)
Dept: MIDWIFE SERVICES | Facility: CLINIC | Age: 44
End: 2024-11-01
Payer: COMMERCIAL

## 2024-11-01 DIAGNOSIS — N95.1 PERIMENOPAUSE: Primary | ICD-10-CM

## 2024-11-01 PROCEDURE — 99213 OFFICE O/P EST LOW 20 MIN: CPT | Mod: 95 | Performed by: ADVANCED PRACTICE MIDWIFE

## 2024-11-01 RX ORDER — LEVONORGESTREL/ETHIN.ESTRADIOL 0.1-0.02MG
1 TABLET ORAL DAILY
Qty: 112 TABLET | Refills: 4 | Status: SHIPPED | OUTPATIENT
Start: 2024-11-01

## 2024-11-01 NOTE — PROGRESS NOTES
Lauren is a 44 year old who is being evaluated via a billable video visit.    How would you like to obtain your AVS? MyChart  If the video visit is dropped, the invitation should be resent by: Text to cell phone: [phone]  Will anyone else be joining your video visit? No        Lauren Callaway 44 year old No LMP recorded. (Menstrual status: IUD).        CC: Perimenopause med check    HPI:  Pt was started on a low dose OCP 24 and pt states her night sweats are better, fogginess and hair thinning are better.  Denies AE s from the OCPs, denies any new bleeding.    Discussed that she was only getting 1 pack of pills per month and running out of the hormone before she got her next pack.  Wrote new rx with instructions to the pharmacist about intention is continuous use of hormones and to skip placebo week.      A/P:  (N95.1) Perimenopause  (primary encounter diagnosis)  Comment:   Plan: levonorgestrel-ethinyl estradiol (AVIANE)         0.1-20 MG-MCG tablet, continuous use x 1 yr   RTC 1 yr for annual             Video-Visit Details        Type of service:  Video Visit   Originating Location (pt. Location):  Home     Distant Location (provider location):  On-site [facility]   Platform used for Video Visit: Gary  Signed Electronically by: ALEX Shukla CNM       Time on video call 4 mins and 31 secs  ALEX Shukla CNM

## 2025-03-30 ENCOUNTER — HEALTH MAINTENANCE LETTER (OUTPATIENT)
Age: 45
End: 2025-03-30

## 2025-06-10 ENCOUNTER — OFFICE VISIT (OUTPATIENT)
Dept: FAMILY MEDICINE | Facility: CLINIC | Age: 45
End: 2025-06-10
Payer: COMMERCIAL

## 2025-06-10 VITALS
WEIGHT: 225 LBS | HEIGHT: 66 IN | RESPIRATION RATE: 19 BRPM | TEMPERATURE: 97.3 F | SYSTOLIC BLOOD PRESSURE: 112 MMHG | DIASTOLIC BLOOD PRESSURE: 78 MMHG | HEART RATE: 92 BPM | BODY MASS INDEX: 36.16 KG/M2 | OXYGEN SATURATION: 97 %

## 2025-06-10 DIAGNOSIS — G89.29 CHRONIC RIGHT HIP PAIN: ICD-10-CM

## 2025-06-10 DIAGNOSIS — Z00.00 ROUTINE GENERAL MEDICAL EXAMINATION AT A HEALTH CARE FACILITY: Primary | ICD-10-CM

## 2025-06-10 DIAGNOSIS — Z12.11 SCREEN FOR COLON CANCER: ICD-10-CM

## 2025-06-10 DIAGNOSIS — Z23 ENCOUNTER FOR IMMUNIZATION: ICD-10-CM

## 2025-06-10 DIAGNOSIS — E66.01 MORBID OBESITY (H): ICD-10-CM

## 2025-06-10 DIAGNOSIS — G89.29 CHRONIC PAIN OF LEFT KNEE: ICD-10-CM

## 2025-06-10 DIAGNOSIS — M25.562 CHRONIC PAIN OF LEFT KNEE: ICD-10-CM

## 2025-06-10 DIAGNOSIS — M25.551 CHRONIC RIGHT HIP PAIN: ICD-10-CM

## 2025-06-10 DIAGNOSIS — R73.03 PREDIABETES: ICD-10-CM

## 2025-06-10 LAB
ERYTHROCYTE [DISTWIDTH] IN BLOOD BY AUTOMATED COUNT: 11.8 % (ref 10–15)
EST. AVERAGE GLUCOSE BLD GHB EST-MCNC: 114 MG/DL
HBA1C MFR BLD: 5.6 % (ref 0–5.6)
HCT VFR BLD AUTO: 40.9 % (ref 35–47)
HGB BLD-MCNC: 13.4 G/DL (ref 11.7–15.7)
MCH RBC QN AUTO: 27.9 PG (ref 26.5–33)
MCHC RBC AUTO-ENTMCNC: 32.8 G/DL (ref 31.5–36.5)
MCV RBC AUTO: 85 FL (ref 78–100)
PLATELET # BLD AUTO: 336 10E3/UL (ref 150–450)
RBC # BLD AUTO: 4.8 10E6/UL (ref 3.8–5.2)
WBC # BLD AUTO: 12.2 10E3/UL (ref 4–11)

## 2025-06-10 PROCEDURE — 3078F DIAST BP <80 MM HG: CPT | Performed by: PHYSICIAN ASSISTANT

## 2025-06-10 PROCEDURE — 90746 HEPB VACCINE 3 DOSE ADULT IM: CPT | Performed by: PHYSICIAN ASSISTANT

## 2025-06-10 PROCEDURE — 36415 COLL VENOUS BLD VENIPUNCTURE: CPT | Performed by: PHYSICIAN ASSISTANT

## 2025-06-10 PROCEDURE — 84443 ASSAY THYROID STIM HORMONE: CPT | Performed by: PHYSICIAN ASSISTANT

## 2025-06-10 PROCEDURE — 3074F SYST BP LT 130 MM HG: CPT | Performed by: PHYSICIAN ASSISTANT

## 2025-06-10 PROCEDURE — 84439 ASSAY OF FREE THYROXINE: CPT | Performed by: PHYSICIAN ASSISTANT

## 2025-06-10 PROCEDURE — 99213 OFFICE O/P EST LOW 20 MIN: CPT | Mod: 25 | Performed by: PHYSICIAN ASSISTANT

## 2025-06-10 PROCEDURE — 90471 IMMUNIZATION ADMIN: CPT | Performed by: PHYSICIAN ASSISTANT

## 2025-06-10 PROCEDURE — 99396 PREV VISIT EST AGE 40-64: CPT | Mod: 25 | Performed by: PHYSICIAN ASSISTANT

## 2025-06-10 PROCEDURE — 83036 HEMOGLOBIN GLYCOSYLATED A1C: CPT | Performed by: PHYSICIAN ASSISTANT

## 2025-06-10 PROCEDURE — 85027 COMPLETE CBC AUTOMATED: CPT | Performed by: PHYSICIAN ASSISTANT

## 2025-06-10 PROCEDURE — 80053 COMPREHEN METABOLIC PANEL: CPT | Performed by: PHYSICIAN ASSISTANT

## 2025-06-10 PROCEDURE — 3044F HG A1C LEVEL LT 7.0%: CPT | Performed by: PHYSICIAN ASSISTANT

## 2025-06-10 PROCEDURE — 80061 LIPID PANEL: CPT | Performed by: PHYSICIAN ASSISTANT

## 2025-06-10 PROCEDURE — 1125F AMNT PAIN NOTED PAIN PRSNT: CPT | Performed by: PHYSICIAN ASSISTANT

## 2025-06-10 RX ORDER — DOXYCYCLINE HYCLATE 50 MG/1
CAPSULE ORAL
COMMUNITY
Start: 2025-02-19

## 2025-06-10 RX ORDER — LAMOTRIGINE 200 MG/1
1 TABLET ORAL
COMMUNITY
Start: 2025-04-02

## 2025-06-10 RX ORDER — TACROLIMUS 1 MG/G
OINTMENT TOPICAL
COMMUNITY
Start: 2025-03-14

## 2025-06-10 SDOH — HEALTH STABILITY: PHYSICAL HEALTH: ON AVERAGE, HOW MANY DAYS PER WEEK DO YOU ENGAGE IN MODERATE TO STRENUOUS EXERCISE (LIKE A BRISK WALK)?: 5 DAYS

## 2025-06-10 ASSESSMENT — PATIENT HEALTH QUESTIONNAIRE - PHQ9
SUM OF ALL RESPONSES TO PHQ QUESTIONS 1-9: 0
10. IF YOU CHECKED OFF ANY PROBLEMS, HOW DIFFICULT HAVE THESE PROBLEMS MADE IT FOR YOU TO DO YOUR WORK, TAKE CARE OF THINGS AT HOME, OR GET ALONG WITH OTHER PEOPLE: NOT DIFFICULT AT ALL
SUM OF ALL RESPONSES TO PHQ QUESTIONS 1-9: 0

## 2025-06-10 ASSESSMENT — PAIN SCALES - GENERAL: PAINLEVEL_OUTOF10: MILD PAIN (2)

## 2025-06-10 ASSESSMENT — SOCIAL DETERMINANTS OF HEALTH (SDOH): HOW OFTEN DO YOU GET TOGETHER WITH FRIENDS OR RELATIVES?: ONCE A WEEK

## 2025-06-10 NOTE — NURSING NOTE
Prior to immunization administration, verified patients identity using patient s name and date of birth. Please see Immunization Activity for additional information.     Screening Questionnaire for Adult Immunization    Are you sick today?   No   Do you have allergies to medications, food, a vaccine component or latex?   No   Have you ever had a serious reaction after receiving a vaccination?   No   Do you have a long-term health problem with heart, lung, kidney, or metabolic disease (e.g., diabetes), asthma, a blood disorder, no spleen, complement component deficiency, a cochlear implant, or a spinal fluid leak?  Are you on long-term aspirin therapy?   No   Do you have cancer, leukemia, HIV/AIDS, or any other immune system problem?   No   Do you have a parent, brother, or sister with an immune system problem?   No   In the past 3 months, have you taken medications that affect  your immune system, such as prednisone, other steroids, or anticancer drugs; drugs for the treatment of rheumatoid arthritis, Crohn s disease, or psoriasis; or have you had radiation treatments?   No   Have you had a seizure, or a brain or other nervous system problem?   No   During the past year, have you received a transfusion of blood or blood    products, or been given immune (gamma) globulin or antiviral drug?   No   For women: Are you pregnant or is there a chance you could become       pregnant during the next month?   No   Have you received any vaccinations in the past 4 weeks?   No     Immunization questionnaire answers were all negative.      Patient instructed to remain in clinic for 15 minutes afterwards, and to report any adverse reactions.     Screening performed by Elli Owens CMA on 6/10/2025 at 3:46 PM.

## 2025-06-10 NOTE — PROGRESS NOTES
"Preventive Care Visit  [facility]  Tara Bautista PA-C, Physician Assistant - Medical  Vick 10, 2025      Assessment & Plan     Routine general medical examination at a health care facility  - REVIEW OF HEALTH MAINTENANCE PROTOCOL ORDERS  - CBC with platelets; Future  - Lipid panel reflex to direct LDL Non-fasting; Future  - TSH with free T4 reflex; Future  - Comprehensive metabolic panel (BMP + Alb, Alk Phos, ALT, AST, Total. Bili, TP); Future  - CBC with platelets  - Lipid panel reflex to direct LDL Non-fasting  - TSH with free T4 reflex  - Comprehensive metabolic panel (BMP + Alb, Alk Phos, ALT, AST, Total. Bili, TP)    Chronic pain of left knee:  - History of knee pain with specific movements, no current dysplasia.  - Recommend physical therapy as the first step. Consider sports medicine consultation if no improvement. Imaging may be considered if physical therapy is ineffective.    Chronic right hip pain:  - Chronic right hip pain with a history of trauma, potential for early arthritis.  - Recommend physical therapy. Consider imaging if no improvement, especially due to trauma history.    Screen for colon cancer:  - Patient prefers colonoscopy. Schedule colonoscopy.    Encounter for immunization:  - Incomplete hepatitis B vaccination series.  - Administer one more hepatitis B vaccine to complete series.      BMI  Estimated body mass index is 36.32 kg/m  as calculated from the following:    Height as of this encounter: 1.676 m (5' 6\").    Weight as of this encounter: 102.1 kg (225 lb).     Counseling  Appropriate preventive services were addressed with this patient via screening, questionnaire, or discussion as appropriate for fall prevention, nutrition, physical activity, Tobacco-use cessation, social engagement, weight loss and cognition.  Checklist reviewing preventive services available has been given to the patient.  Reviewed patient's diet, addressing concerns and/or questions. "         Dipak Aguilar is a 45 year old, presenting for the following:  Annual Visit (Left Knee pain)        6/10/2025     2:59 PM   Additional Questions   Roomed by Elli MATTHEW CMA   Accompanied by Self         6/10/2025   Forms   Any forms needing to be completed Yes         6/10/2025     2:59 PM   Patient Reported Additional Medications   Patient reports taking the following new medications no      History of Present Illness-  Lauren Callaway, 45-year-old female    - Left knee pain with a crunching sound and elastic sensation when sitting on the toilet or doing squats, ongoing for at least 6 months.    - Right hip pain, recurring since high school, worsens when sitting and walking, occasionally requires ibuprofen.  - History of a broken plate in the hip area, no physical therapy needed at the time.  - Current hip pain cycle ongoing for a couple of weeks.    HPI  Advance Care Planning    Health Care Directive received at today's visit.  Forwarded to [employer].        6/10/2025   General Health   How would you rate your overall physical health? (!) FAIR   Feel stress (tense, anxious, or unable to sleep) Only a little   (!) STRESS CONCERN      6/10/2025   Nutrition   Three or more servings of calcium each day? (!) I DON'T KNOW   Diet: Regular (no restrictions)   How many servings of fruit and vegetables per day? (!) 2-3   How many sweetened beverages each day? 0-1         6/10/2025   Exercise   Days per week of moderate/strenous exercise 5 days         6/10/2025   Social Factors   Frequency of gathering with friends or relatives Once a week   Worry food won't last until get money to buy more No   Food not last or not have enough money for food? No   Do you have housing? (Housing is defined as stable permanent housing and does not include staying outside in a car, in a tent, in an abandoned building, in an overnight shelter, or couch-surfing.) Yes   Are you worried about losing your housing? No   Lack of  transportation? No   Unable to get utilities (heat,electricity)? No         6/10/2025   Dental   Dentist two times every year? Yes     Today's PHQ-9 Score:       6/10/2025     2:49 PM   PHQ-9 SCORE   PHQ-9 Total Score MyChart 0   PHQ-9 Total Score 0        Patient-reported         6/10/2025   Substance Use   Alcohol more than 3/day or more than 7/wk No   Do you use any other substances recreationally? (!) CANNABIS PRODUCTS     Social History     Tobacco Use    Smoking status: Former    Smokeless tobacco: Never    Tobacco comments:     quit 3 years ago   Vaping Use    Vaping status: Never Used   Substance Use Topics    Alcohol use: Yes     Comment: social    Drug use: No           9/24/2024   LAST FHS-7 RESULTS   1st degree relative breast or ovarian cancer No   Any relative bilateral breast cancer No   Any male have breast cancer No   Any ONE woman have BOTH breast AND ovarian cancer No   Any woman with breast cancer before 50yrs No   2 or more relatives with breast AND/OR ovarian cancer No   2 or more relatives with breast AND/OR bowel cancer No        Mammogram Screening - Mammogram every 1-2 years updated in Health Maintenance based on mutual decision making        6/10/2025   STI Screening   New sexual partner(s) since last STI/HIV test? No     History of abnormal Pap smear: No - age 30- 64 PAP with HPV every 5 years recommended        Latest Ref Rng & Units 7/7/2020     9:35 AM 7/7/2020     9:27 AM 6/16/2014    12:00 AM   PAP / HPV   PAP (Historical)   NIL  NIL    HPV 16 DNA NEG^Negative Negative      HPV 18 DNA NEG^Negative Negative      Other HR HPV NEG^Negative Negative        ASCVD Risk   The 10-year ASCVD risk score (Kenneth RYAN, et al., 2019) is: 1%    Values used to calculate the score:      Age: 45 years      Sex: Female      Is Non- : No      Diabetic: No      Tobacco smoker: No      Systolic Blood Pressure: 112 mmHg      Is BP treated: No      HDL Cholesterol: 40 mg/dL    "   Total Cholesterol: 193 mg/dL        6/10/2025   Contraception/Family Planning   Questions about contraception or family planning No        Reviewed and updated as needed this visit by Provider   Tobacco   Meds  Problems  Med Hx  Surg Hx  Fam Hx               Objective    Exam  /78 (BP Location: Right arm, Patient Position: Sitting, Cuff Size: Adult Large)   Pulse 92   Temp 97.3  F (36.3  C) (Temporal)   Resp 19   Ht 1.676 m (5' 6\")   Wt 102.1 kg (225 lb)   LMP  (LMP Unknown)   SpO2 97%   BMI 36.32 kg/m     Estimated body mass index is 36.32 kg/m  as calculated from the following:    Height as of this encounter: 1.676 m (5' 6\").    Weight as of this encounter: 102.1 kg (225 lb).    Physical Exam  GENERAL: alert and no distress  EYES: Eyes grossly normal to inspection, PERRL and conjunctivae and sclerae normal  HENT: ear canals and TM's normal, nose and mouth without ulcers or lesions  NECK: no adenopathy, no asymmetry, masses, or scars  RESP: lungs clear to auscultation - no rales, rhonchi or wheezes  CV: regular rate and rhythm, normal S1 S2, no S3 or S4, no murmur, click or rub, no peripheral edema  ABDOMEN: soft, nontender, no hepatosplenomegaly, no masses and bowel sounds normal  MS: no gross musculoskeletal defects noted, no edema  SKIN: no suspicious lesions or rashes  NEURO: Normal strength and tone, mentation intact and speech normal  PSYCH: mentation appears normal, affect normal/bright        Signed Electronically by: Tara Bautista PA-C    "

## 2025-06-10 NOTE — PROGRESS NOTES
Preoperative Evaluation  [address]  [address]  [place] [address]  Phone: [phone]  Fax: [phone]  Primary Provider: Physician No Ref-Primary  Pre-op Performing Provider: Tara Bautista PA-C  Vick 10, 2025   {Provider  Link to PREOP SmartSet  REQUIRED to apply standard patient instructions and medication directions to the AVS :048975}  {ROOMER review and update patient entered surgical information if needed :743672}  { After Pre-op is completed, use lists to pull documentation into note Link to complete Pre-Op    :495861}  {Pull Surgical Information into note after flowsheet completed:288530}  Fax number for surgical facility: {:814209}    {Provider Charting Preference for Preop :063078}    Dipak Aguilar is a 45 year old, presenting for the following:  No chief complaint on file.      {(!) Visit Details have not yet been documented.  Please enter Visit Details and then use this list to pull in documentation. (Optional):028052}  HPI: ***    {Pull Pre-Op Questionnaire into note after flowsheet completed:564783}  Advance Care Planning  {The storyboard will display whether the patient has ACP docs on file. Hover over the Code section in the storyboard to access the ACP documents. :295331}  {(AWV REQUIRED) Advance Care Planning Reviewed:897546}    Preoperative Review of   {Mnpmpreport:538953}  {Review MNPMP for all patients per [facility]CSI MNPMP Profile:010358}    {Chronic problem details (Optional) :060609}    Patient Active Problem List    Diagnosis Date Noted    Mixed anxiety and depressive disorder 02/14/2024     Priority: Medium    Prediabetes 02/14/2024     Priority: Medium    Morbid obesity (H) 07/07/2020     Priority: Medium    Choledocholithiasis 07/05/2014     Priority: Medium    Cholelithiasis with obstruction 07/04/2014     Priority: Medium      Past Medical History:   Diagnosis Date    ADHD (attention deficit hyperactivity disorder)     Gallstones  "    status post cholecystectomy 2014     Past Surgical History:   Procedure Laterality Date    LAPAROSCOPIC CHOLECYSTECTOMY  7/5/2014    Procedure: LAPAROSCOPIC CHOLECYSTECTOMY;  Surgeon: Pako Sherman MD;  Location:  OR    NO HISTORY OF SURGERY       Current Outpatient Medications   Medication Sig Dispense Refill    ketoconazole (NIZORAL) 2 % external shampoo Twice weekly for 4 weeks then weekly. 120 mL 0    lamoTRIgine (LAMICTAL) 150 MG tablet       levonorgestrel (MIRENA, 52 MG,) 52 MG (20 mcg/day) IUD Mirena 20 mcg/24 hours (8 yrs) 52 mg intrauterine device   Take 1 device by intrauterine route for 1825 days.      levonorgestrel-ethinyl estradiol (AVIANE) 0.1-20 MG-MCG tablet Take 1 tablet by mouth daily. To use in a continuous fashion, skip placebo week pills 112 tablet 4    levonorgestrel-ethinyl estradiol (AVIANE) 0.1-20 MG-MCG tablet Take 1 tablet by mouth daily 84 tablet 3    minoxidil (ROGAINE) 2 % external solution Apply topically 2 times daily 60 mL 2    risperiDONE (RISPERDAL) 1 MG tablet Take 1 tablet (1 mg) by mouth 2 times daily      spironolactone (ALDACTONE) 25 MG tablet TAKE ONE TABLET BY MOUTH IN THE MORNING AND ONE IN THE EVENING. TAKE WITH A FULL GLASS OF WATER.      tretinoin (RETIN-A) 0.05 % external cream Apply topically At Bedtime 45 g 1    venlafaxine (EFFEXOR-XR) 150 MG 24 hr capsule          No Known Allergies     Social History     Tobacco Use    Smoking status: Former    Smokeless tobacco: Never    Tobacco comments:     quit 3 years ago   Substance Use Topics    Alcohol use: Yes     Comment: social     {FAMILY HISTORY (Optional):827519256}  History   Drug Use No           {ROS Picklists (Optional):337135}    Objective    There were no vitals taken for this visit.   Estimated body mass index is 37.92 kg/m  as calculated from the following:    Height as of 5/14/24: 1.626 m (5' 4\").    Weight as of 7/30/24: 100.2 kg (220 lb 14.4 oz).  Physical Exam  {Exam List :746155}    No " "results for input(s): \"HGB\", \"PLT\", \"INR\", \"NA\", \"POTASSIUM\", \"CR\", \"A1C\" in the last 8760 hours.     Diagnostics  {LABS:011398}   {EK}    Revised Cardiac Risk Index (RCRI)  The patient has the following serious cardiovascular risks for perioperative complications:  {PREOP REVISED CARDIAC RISK INDEX (RCRI) :801250}     RCRI Interpretation: {REVISED CARDIAC RISK INTERPRETATION :529537}         Signed Electronically by: Tara Bautista PA-C  A copy of this evaluation report is provided to the requesting physician.    {Provider Resources  Preop [facility] Preop Guidelines  Revised Cardiac Risk Index :723301}   {Email feedback regarding this note to [email or web address]   :687864}  "

## 2025-06-10 NOTE — PATIENT INSTRUCTIONS
Patient Education   Preventive Care Advice   This is general advice given by our system to help you stay healthy. However, your care team may have specific advice just for you. Please talk to your care team about your preventive care needs.  Nutrition  Eat 5 or more servings of fruits and vegetables each day.  Try wheat bread, brown rice and whole grain pasta (instead of white bread, rice, and pasta).  Get enough calcium and vitamin D. Check the label on foods and aim for 100% of the RDA (recommended daily allowance).  Lifestyle  Exercise at least 150 minutes each week  (30 minutes a day, 5 days a week).  Do muscle strengthening activities 2 days a week. These help control your weight and prevent disease.  No smoking.  Wear sunscreen to prevent skin cancer.  Have a dental exam and cleaning every 6 months.  Yearly exams  See your health care team every year to talk about:  Any changes in your health.  Any medicines your care team has prescribed.  Preventive care, family planning, and ways to prevent chronic diseases.  Shots (vaccines)   HPV shots (up to age 26), if you've never had them before.  Hepatitis B shots (up to age 59), if you've never had them before.  COVID-19 shot: Get this shot when it's due.  Flu shot: Get a flu shot every year.  Tetanus shot: Get a tetanus shot every 10 years.  Pneumococcal, hepatitis A, and RSV shots: Ask your care team if you need these based on your risk.  Shingles shot (for age 50 and up)  General health tests  Diabetes screening:  Starting at age 35, Get screened for diabetes at least every 3 years.  If you are younger than age 35, ask your care team if you should be screened for diabetes.  Cholesterol test: At age 39, start having a cholesterol test every 5 years, or more often if advised.  Bone density scan (DEXA): At age 50, ask your care team if you should have this scan for osteoporosis (brittle bones).  Hepatitis C: Get tested at least once in your life.  STIs (sexually  transmitted infections)  Before age 24: Ask your care team if you should be screened for STIs.  After age 24: Get screened for STIs if you're at risk. You are at risk for STIs (including HIV) if:  You are sexually active with more than one person.  You don't use condoms every time.  You or a partner was diagnosed with a sexually transmitted infection.  If you are at risk for HIV, ask about PrEP medicine to prevent HIV.  Get tested for HIV at least once in your life, whether you are at risk for HIV or not.  Cancer screening tests  Cervical cancer screening: If you have a cervix, begin getting regular cervical cancer screening tests starting at age 21.  Breast cancer scan (mammogram): If you've ever had breasts, begin having regular mammograms starting at age 40. This is a scan to check for breast cancer.  Colon cancer screening: It is important to start screening for colon cancer at age 45.  Have a colonoscopy test every 10 years (or more often if you're at risk) Or, ask your provider about stool tests like a FIT test every year or Cologuard test every 3 years.  To learn more about your testing options, visit:   .  For help making a decision, visit:   https://bit.ly/ki54240.  Prostate cancer screening test: If you have a prostate, ask your care team if a prostate cancer screening test (PSA) at age 55 is right for you.  Lung cancer screening: If you are a current or former smoker ages 50 to 80, ask your care team if ongoing lung cancer screenings are right for you.  For informational purposes only. Not to replace the advice of your health care provider. Copyright   2023 [employer] All rights reserved. Clinically reviewed by the [facility] Transitions Program. [employer] 330207 - REV 01/24.

## 2025-06-11 LAB
ALBUMIN SERPL BCG-MCNC: 4.1 G/DL (ref 3.5–5.2)
ALP SERPL-CCNC: 96 U/L (ref 40–150)
ALT SERPL W P-5'-P-CCNC: 15 U/L (ref 0–50)
ANION GAP SERPL CALCULATED.3IONS-SCNC: 13 MMOL/L (ref 7–15)
AST SERPL W P-5'-P-CCNC: 20 U/L (ref 0–45)
BILIRUB SERPL-MCNC: <0.2 MG/DL
BUN SERPL-MCNC: 14.6 MG/DL (ref 6–20)
CALCIUM SERPL-MCNC: 9.5 MG/DL (ref 8.8–10.4)
CHLORIDE SERPL-SCNC: 101 MMOL/L (ref 98–107)
CHOLEST SERPL-MCNC: 211 MG/DL
CREAT SERPL-MCNC: 0.94 MG/DL (ref 0.51–0.95)
EGFRCR SERPLBLD CKD-EPI 2021: 76 ML/MIN/1.73M2
FASTING STATUS PATIENT QL REPORTED: NO
FASTING STATUS PATIENT QL REPORTED: NO
GLUCOSE SERPL-MCNC: 86 MG/DL (ref 70–99)
HCO3 SERPL-SCNC: 24 MMOL/L (ref 22–29)
HDLC SERPL-MCNC: 44 MG/DL
LDLC SERPL CALC-MCNC: 110 MG/DL
NONHDLC SERPL-MCNC: 167 MG/DL
POTASSIUM SERPL-SCNC: 4 MMOL/L (ref 3.4–5.3)
PROT SERPL-MCNC: 7.3 G/DL (ref 6.4–8.3)
SODIUM SERPL-SCNC: 138 MMOL/L (ref 135–145)
T4 FREE SERPL-MCNC: 1.08 NG/DL (ref 0.9–1.7)
TRIGL SERPL-MCNC: 287 MG/DL
TSH SERPL DL<=0.005 MIU/L-ACNC: 5.01 UIU/ML (ref 0.3–4.2)

## 2025-06-13 ENCOUNTER — RESULTS FOLLOW-UP (OUTPATIENT)
Dept: FAMILY MEDICINE | Facility: CLINIC | Age: 45
End: 2025-06-13

## 2025-06-13 DIAGNOSIS — R79.89 ELEVATED TSH: Primary | ICD-10-CM

## 2025-06-18 ENCOUNTER — THERAPY VISIT (OUTPATIENT)
Dept: PHYSICAL THERAPY | Facility: CLINIC | Age: 45
End: 2025-06-18
Attending: PHYSICIAN ASSISTANT
Payer: COMMERCIAL

## 2025-06-18 DIAGNOSIS — G89.29 CHRONIC RIGHT HIP PAIN: ICD-10-CM

## 2025-06-18 DIAGNOSIS — M25.551 CHRONIC RIGHT HIP PAIN: ICD-10-CM

## 2025-06-18 DIAGNOSIS — G89.29 CHRONIC PAIN OF LEFT KNEE: ICD-10-CM

## 2025-06-18 DIAGNOSIS — M54.50 ACUTE RIGHT-SIDED LOW BACK PAIN WITHOUT SCIATICA: Primary | ICD-10-CM

## 2025-06-18 DIAGNOSIS — M25.562 CHRONIC PAIN OF LEFT KNEE: ICD-10-CM

## 2025-06-18 PROCEDURE — 97110 THERAPEUTIC EXERCISES: CPT | Mod: GP | Performed by: PHYSICAL THERAPIST

## 2025-06-18 PROCEDURE — 97161 PT EVAL LOW COMPLEX 20 MIN: CPT | Mod: GP | Performed by: PHYSICAL THERAPIST

## 2025-06-18 ASSESSMENT — ACTIVITIES OF DAILY LIVING (ADL)
WALKING_15_MINUTES_OR_GREATER: MODERATE DIFFICULTY
LIGHT_TO_MODERATE_WORK: MODERATE DIFFICULTY
WALKING_DOWN_STEEP_HILLS: MODERATE DIFFICULTY
PLEASE_INDICATE_YOR_PRIMARY_REASON_FOR_REFERRAL_TO_THERAPY:: KNEE
WALKING_APPROXIMATELY_10_MINUTES: SLIGHT DIFFICULTY
AS_A_RESULT_OF_YOUR_KNEE_INJURY,_HOW_WOULD_YOU_RATE_YOUR_CURRENT_LEVEL_OF_DAILY_ACTIVITY?: NEARLY NORMAL
HEAVY_WORK: EXTREME DIFFICULTY
SQUAT: ACTIVITY IS VERY DIFFICULT
DEEP_SQUATTING: EXTREME DIFFICULTY
GOING DOWN 1 FLIGHT OF STAIRS: SLIGHT DIFFICULTY
TWISTING/PIVOTING_ON_INVOLVED_LEG: EXTREME DIFFICULTY
AS_A_RESULT_OF_YOUR_KNEE_INJURY,_HOW_WOULD_YOU_RATE_YOUR_CURRENT_LEVEL_OF_DAILY_ACTIVITY?: NEARLY NORMAL
PLEASE_INDICATE_YOR_PRIMARY_REASON_FOR_REFERRAL_TO_THERAPY:: HIP
ROLLING_OVER_IN_BED: SLIGHT DIFFICULTY
HOS_ADL_SCORE(%): 59.38
GIVING WAY, BUCKLING OR SHIFTING OF KNEE: THE SYMPTOM AFFECTS MY ACTIVITY MODERATELY
HOW_WOULD_YOU_RATE_THE_OVERALL_FUNCTION_OF_YOUR_KNEE_DURING_YOUR_USUAL_DAILY_ACTIVITIES?: NORMAL
STAND: ACTIVITY IS NOT DIFFICULT
SITTING FOR 15 MINUTES: NO DIFFICULTY AT ALL
WALKING_INITIALLY: SLIGHT DIFFICULTY
HOW_WOULD_YOU_RATE_THE_OVERALL_FUNCTION_OF_YOUR_KNEE_DURING_YOUR_USUAL_DAILY_ACTIVITIES?: NORMAL
HOS_ADL_HIGHEST_POTENTIAL_SCORE: 64
HOW_WOULD_YOU_RATE_THE_CURRENT_FUNCTION_OF_YOUR_KNEE_DURING_YOUR_USUAL_DAILY_ACTIVITIES_ON_A_SCALE_FROM_0_TO_100_WITH_100_BEING_YOUR_LEVEL_OF_KNEE_FUNCTION_PRIOR_TO_YOUR_INJURY_AND_0_BEING_THE_INABILITY_TO_PERFORM_ANY_OF_YOUR_USUAL_DAILY_ACTIVITIES?: 80
SITTING_FOR_15_MINUTES: NO DIFFICULTY AT ALL
WALKING_UP_STEEP_HILLS: MODERATE DIFFICULTY
RECREATIONAL ACTIVITIES: MODERATE DIFFICULTY
PAIN: THE SYMPTOM AFFECTS MY ACTIVITY SLIGHTLY
WALKING_INITIALLY: SLIGHT DIFFICULTY
GOING_DOWN_1_FLIGHT_OF_STAIRS: SLIGHT DIFFICULTY
GO UP STAIRS: ACTIVITY IS MINIMALLY DIFFICULT
GOING UP 1 FLIGHT OF STAIRS: SLIGHT DIFFICULTY
GETTING_INTO_AND_OUT_OF_AN_AVERAGE_CAR: SLIGHT DIFFICULTY
HOW_WOULD_YOU_RATE_THE_CURRENT_FUNCTION_OF_YOUR_KNEE_DURING_YOUR_USUAL_DAILY_ACTIVITIES_ON_A_SCALE_FROM_0_TO_100_WITH_100_BEING_YOUR_LEVEL_OF_KNEE_FUNCTION_PRIOR_TO_YOUR_INJURY_AND_0_BEING_THE_INABILITY_TO_PERFORM_ANY_OF_YOUR_USUAL_DAILY_ACTIVITIES?: 80
KNEE_ACTIVITY_OF_DAILY_LIVING_SUM: 51
LIGHT_TO_MODERATE_WORK: MODERATE DIFFICULTY
SPORTS_HIGHEST_POTENTIAL_SCORE: 36
STEPPING_UP_AND_DOWN_CURBS: NO DIFFICULTY AT ALL
STIFFNESS: I DO NOT HAVE THE SYMPTOM
STANDING FOR 15 MINUTES: SLIGHT DIFFICULTY
ABILITY_TO_PARTICIPATE_IN_YOUR_DESIRED_SPORT_AS_LONG_AS_YOU_WOULD_LIKE: UNABLE TO DO
SIT WITH YOUR KNEE BENT: ACTIVITY IS NOT DIFFICULT
DEEP SQUATTING: EXTREME DIFFICULTY
HOW_WOULD_YOU_RATE_YOUR_CURRENT_LEVEL_OF_FUNCTION_DURING_YOUR_SPORTS_RELATED_ACTIVITIES_FROM_0_TO_100_WITH_100_BEING_YOUR_LEVEL_OF_FUNCTION_PRIOR_TO_YOUR_HIP_PROBLEM_AND_0_BEING_THE_INABILITY_TO_PERFORM_ANY_OF_YOUR_USUAL_DAILY_ACTIVITIES?: 50
SQUAT: ACTIVITY IS VERY DIFFICULT
ADL_TOTAL_ITEM_SCORE: 0
RAW_SCORE: 51
SPORTS_TOTAL_ITEM_SCORE: 0
STIFFNESS: I DO NOT HAVE THE SYMPTOM
WALK: ACTIVITY IS MINIMALLY DIFFICULT
KNEEL ON THE FRONT OF YOUR KNEE: ACTIVITY IS FAIRLY DIFFICULT
GO UP STAIRS: ACTIVITY IS MINIMALLY DIFFICULT
PAIN: THE SYMPTOM AFFECTS MY ACTIVITY SLIGHTLY
GETTING INTO AND OUT OF AN AVERAGE CAR: SLIGHT DIFFICULTY
SWELLING: I DO NOT HAVE THE SYMPTOM
ADL_HIGHEST_POTENTIAL_SCORE: 68
WALKING_DOWN_STEEP_HILLS: MODERATE DIFFICULTY
RISE FROM A CHAIR: ACTIVITY IS MINIMALLY DIFFICULT
STANDING_FOR_15_MINUTES: SLIGHT DIFFICULTY
RECREATIONAL_ACTIVITIES: MODERATE DIFFICULTY
PUTTING_ON_SOCKS_AND_SHOES: SLIGHT DIFFICULTY
KNEEL ON THE FRONT OF YOUR KNEE: ACTIVITY IS FAIRLY DIFFICULT
WALKING_FOR_APPROXIMATELY_10_MINUTES: SLIGHT DIFFICULTY
ADL_SCORE(%): 0
SWELLING: I DO NOT HAVE THE SYMPTOM
HOW_WOULD_YOU_RATE_YOUR_CURRENT_LEVEL_OF_FUNCTION_DURING_YOUR_USUAL_ACTIVITIES_OF_DAILY_LIVING_FROM_0_TO_100_WITH_100_BEING_YOUR_LEVEL_OF_FUNCTION_PRIOR_TO_YOUR_HIP_PROBLEM_AND_0_BEING_THE_INABILITY_TO_PERFORM_ANY_OF_YOUR_USUAL_DAILY_ACTIVITIES?: 80
SIT WITH YOUR KNEE BENT: ACTIVITY IS NOT DIFFICULT
LIMPING: I DO NOT HAVE THE SYMPTOM
WEAKNESS: THE SYMPTOM AFFECTS MY ACTIVITY MODERATELY
STEPPING UP AND DOWN CURBS: NO DIFFICULTY AT ALL
WALKING_UP_STEEP_HILLS: MODERATE DIFFICULTY
WALK: ACTIVITY IS MINIMALLY DIFFICULT
HOW_WOULD_YOU_RATE_YOUR_CURRENT_LEVEL_OF_FUNCTION?: ABNORMAL
LIMPING: I DO NOT HAVE THE SYMPTOM
WEAKNESS: THE SYMPTOM AFFECTS MY ACTIVITY MODERATELY
LOW_IMPACT_ACTIVITIES_LIKE_FAST_WALKING: MODERATE DIFFICULTY
STAND: ACTIVITY IS NOT DIFFICULT
TWISTING/PIVOTING ON INVOLVED LEG: EXTREME DIFFICULTY
WALKING_15_MINUTES_OR_GREATER: MODERATE DIFFICULTY
GO DOWN STAIRS: ACTIVITY IS MINIMALLY DIFFICULT
GIVING WAY, BUCKLING OR SHIFTING OF KNEE: THE SYMPTOM AFFECTS MY ACTIVITY MODERATELY
HEAVY_WORK: EXTREME DIFFICULTY
RISE FROM A CHAIR: ACTIVITY IS MINIMALLY DIFFICULT
GO DOWN STAIRS: ACTIVITY IS MINIMALLY DIFFICULT
HOS_ADL_ITEM_SCORE_TOTAL: 38
GOING_UP_1_FLIGHT_OF_STAIRS: SLIGHT DIFFICULTY
KNEE_ACTIVITY_OF_DAILY_LIVING_SCORE: 72.86
ROLLING OVER IN BED: SLIGHT DIFFICULTY
HOW_WOULD_YOU_RATE_YOUR_CURRENT_LEVEL_OF_FUNCTION_DURING_YOUR_USUAL_ACTIVITIES_OF_DAILY_LIVING_FROM_0_TO_100_WITH_100_BEING_YOUR_LEVEL_OF_FUNCTION_PRIOR_TO_YOUR_HIP_PROBLEM_AND_0_BEING_THE_INABILITY_TO_PERFORM_ANY_OF_YOUR_USUAL_DAILY_ACTIVITIES?: 80
ABILITY_TO_PERFORM_ACTIVITY_WITH_YOUR_NORMAL_TECHNIQUE: MODERATE DIFFICULTY
SPORTS_COUNT: 9
SPORTS_SCORE(%): 0
PUTTING ON SOCKS AND SHOES: SLIGHT DIFFICULTY
ADL_COUNT: 17

## 2025-06-18 NOTE — PROGRESS NOTES
PHYSICAL THERAPY EVALUATION  Type of Visit: Evaluation        Fall Risk Screen:  Have you fallen 2 or more times in the past year?: No  Have you fallen and had an injury in the past year?: No    Subjective         Presenting condition or subjective complaint: hip pain and knee weakness. Pt presents to PT with a chief complaint of R gluteal pain with reported insidious onset ~2 weeks ago. Primary pain at R superior gluteals and reported to be worse with bending forward, prolonged sitting, and carrying/lifting weight upstairs. PMH of R hip fx when she was 15 and has had intermittent episodes since that time  Date of onset: 06/04/25    Relevant medical history:     Dates & types of surgery:      Prior diagnostic imaging/testing results: Other none   Prior therapy history for the same diagnosis, illness or injury: No        Living Environment  Social support: With a significant other or spouse   Type of home: House; 2-story; Basement   Stairs to enter the home: Yes 4 Is there a railing: Yes     Ramp: No   Stairs inside the home: Yes 12 Is there a railing: Yes     Help at home: Home management tasks (cooking, cleaning); Home and Yard maintenance tasks  Equipment owned:       Employment: No    Hobbies/Interests: gardening walking reading    Patient goals for therapy: just move with the hip pain    Pain assessment: See objective evaluation for additional pain details     Objective   HIP EVALUATION  PAIN: Pain Level at Rest: 4/10  Pain Level with Use: 8/10  Pain Location: R gluteals  Pain Quality: Aching  Pain Frequency: constant  Pain is Worst: nighttime  Pain is Exacerbated By: prolonged sitting, bending, lifting,   Pain is Relieved By: NSAIDs  Pain Progression: Unchanged  INTEGUMENTARY (edema, incisions):   POSTURE:   GAIT:   Weightbearing Status:   Assistive Device(s):   Gait Deviations:   BALANCE/PROPRIOCEPTION:   WEIGHTBEARING ALIGNMENT:   NON-WEIGHTBEARING ALIGNMENT:    ROM:   (Degrees) Left AROM Left PROM  Right  AROM Right PROM   Hip Flexion    WNL, pain +   Hip Extension       Hip Abduction       Hip Adduction       Hip Internal Rotation    WNL, pain +   Hip External Rotation    WNL, pain ++   Knee Flexion       Knee Extension       Lumbar Side glide     Lumbar Flexion Mod loss, pain ++   Lumbar Extension Mod loss, pain +   Pain:   End feel:     PELVIC/SI SCREEN:   STRENGTH:   Pain: - none + mild ++ moderate +++ severe  Strength Scale: 0-5/5 Left Right   Hip Flexion 5 4, + (mild)   Hip Extension 4+ 4, + (mild)   Hip Abduction 4 4-, + (mild)   Hip Adduction     Hip Internal Rotation     Hip External Rotation     Knee Flexion     Knee Extension       LE FLEXIBILITY:   SPECIAL TESTS:   FUNCTIONAL TESTS:   PALPATION: TTP at R PSIS, superior gluteals  JOINT MOBILITY: hypomobile lower lumbar spine w/ pain ++    Assessment & Plan   CLINICAL IMPRESSIONS  Medical Diagnosis: R gluteal pain/R sided LBP    Treatment Diagnosis: R gluteal pain/R sided LBP   Impression/Assessment: Patient is a 45 year old female with R hip/low back complaints.  The following significant findings have been identified: Pain, Decreased ROM/flexibility, Decreased joint mobility, Decreased strength, and Impaired muscle performance. These impairments interfere with their ability to perform self care tasks, work tasks, recreational activities, household chores, driving , household mobility, and community mobility as compared to previous level of function.     Clinical Decision Making (Complexity):  Clinical Presentation: Evolving/Changing  Clinical Presentation Rationale: based on medical and personal factors listed in PT evaluation  Clinical Decision Making (Complexity): Moderate complexity    PLAN OF CARE  Treatment Interventions:  Interventions: Manual Therapy, Neuromuscular Re-education, Therapeutic Activity, Therapeutic Exercise    Long Term Goals     PT Goal 1  Goal Identifier: Bending/lifting  Goal Description: Pt will be able to bend forward fully and  lift 10 lbs w/o increased gluteal pain  Rationale: to maximize safety and independence with performance of ADLs and functional tasks;to maximize safety and independence within the home  Goal Progress: pain 7/10 with bending  Target Date: 08/13/25      Frequency of Treatment: 1x week  Duration of Treatment: 6 weeks    Recommended Referrals to Other Professionals:   Education Assessment:   Learner/Method: Patient;No Barriers to Learning    Risks and benefits of evaluation/treatment have been explained.   Patient/Family/caregiver agrees with Plan of Care.     Evaluation Time:     PT Eval, Low Complexity Minutes (70136): 20       Signing Clinician: José Antonio Byrd PT

## 2025-06-24 ENCOUNTER — THERAPY VISIT (OUTPATIENT)
Dept: PHYSICAL THERAPY | Facility: CLINIC | Age: 45
End: 2025-06-24
Payer: COMMERCIAL

## 2025-06-24 DIAGNOSIS — M54.50 ACUTE RIGHT-SIDED LOW BACK PAIN WITHOUT SCIATICA: Primary | ICD-10-CM

## 2025-06-24 PROCEDURE — 97112 NEUROMUSCULAR REEDUCATION: CPT | Mod: GP | Performed by: PHYSICAL THERAPIST

## 2025-06-24 PROCEDURE — 97110 THERAPEUTIC EXERCISES: CPT | Mod: GP | Performed by: PHYSICAL THERAPIST

## 2025-06-24 PROCEDURE — 97140 MANUAL THERAPY 1/> REGIONS: CPT | Mod: GP | Performed by: PHYSICAL THERAPIST

## 2025-07-07 ENCOUNTER — TELEPHONE (OUTPATIENT)
Dept: GASTROENTEROLOGY | Facility: CLINIC | Age: 45
End: 2025-07-07
Payer: COMMERCIAL

## 2025-07-07 NOTE — TELEPHONE ENCOUNTER
"Endoscopy Scheduling Screen    Caller: patient    Have you had any respiratory illness or flu-like symptoms in the last 10 days?  No    Patient is ACTIVE on [employer].  Inform patient that all appointment instructions will be sent via [employer].    Review patient's insurance for any non participating payor.    Ordering/Referring Provider:     KIRAN CALLAWAY      (If ordering provider performs procedure, schedule with ordering provider unless otherwise instructed. )    BMI: Estimated body mass index is 36.32 kg/m  as calculated from the following:    Height as of 6/10/25: 1.676 m (5' 6\").    Weight as of 6/10/25: 102.1 kg (225 lb).     Sedation Ordered  moderate sedation.   If patient BMI > 50 do not schedule in ASC.    If patient BMI > 45 do not schedule at [facility].    Are you taking methadone or Suboxone?  NO, No RN review required.    Have you been diagnosed and are being treated for severe PTSD or severe anxiety?  NO, No RN review required.    Are you taking any prescription medications for pain 3 or more times per week?   NO, No RN review required.    Do you have a history of malignant hyperthermia?  No    (Females) Are you currently pregnant?   No     Have you been diagnosed or told you have pulmonary hypertension?   No    Do you have an LVAD?  No    Have you been told you have moderate to severe sleep apnea?  No.    Have you been told you have COPD, asthma, or any other lung disease?  No    Has your doctor ordered any cardiac tests like echo, angiogram, stress test, ablation, or EKG, that you have not completed yet?  No    Do you  have a history of any heart conditions?  No     Have you ever had or are you waiting for an organ transplant?  No. Continue scheduling, no site restrictions.    Have you had a stroke or transient ischemic attack (TIA aka \"mini stroke\") in the last 2 years?   No.    Have you been diagnosed with or been told you have cirrhosis of the liver?   No.    Are you currently on dialysis?   No    Do you " "need assistance transferring?   No    BMI: Estimated body mass index is 36.32 kg/m  as calculated from the following:    Height as of 6/10/25: 1.676 m (5' 6\").    Weight as of 6/10/25: 102.1 kg (225 lb).     Is patients BMI > 40 and scheduling location [facility]?  No    Do you take an injectable or oral medication for weight loss or diabetes (excluding insulin)?  No    Do you take the medication Naltrexone?  No    Do you take blood thinners?  No       Prep   Are you currently on dialysis or do you have chronic kidney disease?  No    Do you have a diagnosis of diabetes?  No    Do you have a diagnosis of cystic fibrosis (CF)?  No    On a regular basis do you go 3 -5 days between bowel movements?  No    BMI > 40?  No    Preferred Pharmacy:      [facility] [id number] [place] [address]  [address] [facility] [address]  Phone: [phone] Fax: [phone]      Final Scheduling Details     Procedure scheduled  Colonoscopy    Surgeon:  Linda      Date of procedure:  07/15/2025     Pre-OP / PAC:   No - Not required for this site.    Location  [facility] - Patient preference.    Sedation   Moderate Sedation - Per order.      Patient Reminders:   You will receive a call from a Nurse to review instructions and health history.  This assessment must be completed prior to your procedure.  Failure to complete the Nurse assessment may result in the procedure being cancelled.      On the day of your procedure, please designate an adult(s) who can drive you home stay with you for the next 24 hours. The medicines used in the exam will make you sleepy. You will not be able to drive.      You cannot take public transportation, ride share services, or non-medical taxi service without a responsible caregiver.  Medical transport services are allowed with the requirement that a responsible caregiver will receive you at your destination.  We require that drivers and caregivers are confirmed prior to your procedure.  "

## 2025-07-08 ENCOUNTER — TELEPHONE (OUTPATIENT)
Dept: GASTROENTEROLOGY | Facility: CLINIC | Age: 45
End: 2025-07-08
Payer: COMMERCIAL

## 2025-07-08 ENCOUNTER — MYC MEDICAL ADVICE (OUTPATIENT)
Dept: GASTROENTEROLOGY | Facility: CLINIC | Age: 45
End: 2025-07-08
Payer: COMMERCIAL

## 2025-07-08 NOTE — TELEPHONE ENCOUNTER
Pre visit planning completed.      Procedure details:    Patient scheduled for Colonoscopy on 7.15.2025.     Arrival time: 0945. Procedure time 1030    Facility location: [facility]; [facility] [place] [address]. Check in location: [facility]st [facility] [place], operated by [facility].     Sedation type: Conscious sedation     Pre op exam needed? No.    Indication for procedure: screening colonoscopy      Chart review:     Electronic implanted devices? No    Recent diagnosis of diverticulitis within the last 6 weeks? No      Medication review:    Diabetic? No    Anticoagulants? No    Weight loss medication/injectable? No GLP-1 medication per patient's medication list. Nursing to verify with pre-assessment call.    Other medication HOLDING recommendations:  Cannabis/Marijuana: Stop night before procedure.      Prep for procedure:     Bowel prep recommendation: Standard Miralax.   Due to: standard bowel prep    Procedure information and instructions sent via [employer] Rodriges RN  Endoscopy Procedure Pre Assessment   [phone] option 3

## 2025-07-08 NOTE — TELEPHONE ENCOUNTER
Rescheduled Procedure  Due to: scheduling conflict    Pre-assessment previously completed on Today 7-8-25, NO additional call was made     Patient scheduled for Colonoscopy on 7-22-25.    Arrival time: 0815. Procedure time 0900    Facility location: [facility]; [facility] [place] [address] [place] [address]    Sedation type: Conscious sedation     Pre op exam needed? No.    Pre-Assessment was completed for previously scheduled procedure. (See documentation below).  No new medical events or medications since last review.     Sent a [employer] message for Patient to return call to pre-assessment team at [phone] option 3 if they have any questions regarding rescheduled procedure and/or how to prepare.    Lidia Hinojosa RN  Endoscopy Procedure Pre Assessment

## 2025-07-08 NOTE — TELEPHONE ENCOUNTER
Caller: Lauren Callaway      Reason for Reschedule/Cancellation (please be detailed, any staff messages or encounters to note?):   Conflict with personal schedule    Did you cancel or rescheduled an EUS procedure? No.    Is screening questionnaire older than 3 months from the reschedule date.   If Yes, please complete screening questionnaire. No    Prior to reschedule please review:  Ordering Provider: KIRAN CALLAWAY   Sedation Determined: cs  Does patient have any ASC Exclusions, please identify?: n    Notes on Cancelled Procedure:  Procedure: Lower Endoscopy [Colonoscopy]   Date: 7/15  Location: [facility]; [address] [place] [address]   Surgeon: Sun    Rescheduled: Yes,   Procedure: Lower Endoscopy [Colonoscopy]    Date: 7/22   Location: [facility]; [address] [place] [address]    Surgeon: Sun   Sedation Level Scheduled  cs ,  Reason for Sedation Level per order   Instructions updated and sent: y     Does patient need PAC or Pre -Op Rescheduled? : n

## 2025-07-08 NOTE — TELEPHONE ENCOUNTER
Pre assessment completed for upcoming procedure.   (Please see previous telephone encounter notes for complete details)      Procedure details:    Procedure date 7.15.2025, arrival time 9:45am and facility location reviewed.    Pre op exam needed? No.    Designated  policy reviewed. Instructed to have someone stay 6  hours post procedure.       Medication review:    Medications reviewed. Please see supporting documentation below. Holding recommendations discussed (if applicable).       Prep for procedure:     Procedure prep instructions reviewed.        Any additional information needed:  N/A      Patient verbalized understanding and had no questions or concerns at this time.      Erlinda Rodriges RN  Endoscopy Procedure Pre Assessment   [phone] option 3

## 2025-07-22 ENCOUNTER — HOSPITAL ENCOUNTER (OUTPATIENT)
Facility: CLINIC | Age: 45
Discharge: HOME | End: 2025-07-22
Attending: COLON & RECTAL SURGERY | Admitting: COLON & RECTAL SURGERY
Payer: COMMERCIAL

## 2025-07-22 VITALS
DIASTOLIC BLOOD PRESSURE: 79 MMHG | RESPIRATION RATE: 16 BRPM | HEART RATE: 86 BPM | SYSTOLIC BLOOD PRESSURE: 120 MMHG | OXYGEN SATURATION: 98 %

## 2025-07-22 LAB — COLONOSCOPY: NORMAL

## 2025-07-22 PROCEDURE — G0500 MOD SEDAT ENDO SERVICE >5YRS: HCPCS | Performed by: COLON & RECTAL SURGERY

## 2025-07-22 PROCEDURE — 99153 MOD SED SAME PHYS/QHP EA: CPT | Mod: PT | Performed by: COLON & RECTAL SURGERY

## 2025-07-22 PROCEDURE — 45385 COLONOSCOPY W/LESION REMOVAL: CPT | Mod: PT | Performed by: COLON & RECTAL SURGERY

## 2025-07-22 PROCEDURE — 88305 TISSUE EXAM BY PATHOLOGIST: CPT | Mod: TC | Performed by: COLON & RECTAL SURGERY

## 2025-07-22 PROCEDURE — 250N000011 HC RX IP 250 OP 636: Performed by: COLON & RECTAL SURGERY

## 2025-07-22 PROCEDURE — 88305 TISSUE EXAM BY PATHOLOGIST: CPT | Mod: 26

## 2025-07-22 RX ORDER — NALOXONE HYDROCHLORIDE 0.4 MG/ML
0.4 INJECTION, SOLUTION INTRAMUSCULAR; INTRAVENOUS; SUBCUTANEOUS
Status: CANCELLED | OUTPATIENT
Start: 2025-07-22

## 2025-07-22 RX ORDER — NALOXONE HYDROCHLORIDE 0.4 MG/ML
0.2 INJECTION, SOLUTION INTRAMUSCULAR; INTRAVENOUS; SUBCUTANEOUS
Status: CANCELLED | OUTPATIENT
Start: 2025-07-22

## 2025-07-22 RX ORDER — FLUMAZENIL 0.1 MG/ML
0.2 INJECTION, SOLUTION INTRAVENOUS
Status: CANCELLED | OUTPATIENT
Start: 2025-07-22 | End: 2025-07-22

## 2025-07-22 RX ORDER — ONDANSETRON 2 MG/ML
4 INJECTION INTRAMUSCULAR; INTRAVENOUS EVERY 6 HOURS PRN
Status: CANCELLED | OUTPATIENT
Start: 2025-07-22

## 2025-07-22 RX ORDER — PROCHLORPERAZINE MALEATE 10 MG
10 TABLET ORAL EVERY 6 HOURS PRN
Status: CANCELLED | OUTPATIENT
Start: 2025-07-22

## 2025-07-22 RX ORDER — FENTANYL CITRATE 50 UG/ML
INJECTION, SOLUTION INTRAMUSCULAR; INTRAVENOUS PRN
Status: DISCONTINUED | OUTPATIENT
Start: 2025-07-22 | End: 2025-07-22 | Stop reason: HOSPADM

## 2025-07-22 RX ORDER — ONDANSETRON 4 MG/1
4 TABLET, ORALLY DISINTEGRATING ORAL EVERY 6 HOURS PRN
Status: CANCELLED | OUTPATIENT
Start: 2025-07-22

## 2025-07-22 ASSESSMENT — ACTIVITIES OF DAILY LIVING (ADL)
ADLS_ACUITY_SCORE: 41
ADLS_ACUITY_SCORE: 41

## 2025-07-22 NOTE — H&P
Pre-Endoscopy History and Physical   Lauren Callaway MRN# [id number]   YOB: 1980 Age: 45 year old   Date of Procedure: 7/22/2025   Primary care provider: No Ref-Primary, Physician   Type of Endoscopy: colonoscopy   Reason for Procedure: screening   Type of Anesthesia Anticipated: Moderate Sedation   HPI:   Lauren is a 45 year old female for screening colonoscopy.  She has never had a colonoscopy before.  She denies BRBPR, abdominal pain, nausea/vomiting, changes in bowel habits or unintentional weight loss.  She denies a FH of CRC.    No Known Allergies   Prior to Admission Medications   Prescriptions Last Dose Informant Patient Reported? Taking?   doxycycline hyclate (VIBRAMYCIN) 50 MG capsule Unknown  Yes No   Sig: TAKE 1 CAPSULE BY MOUTH TWICE DAILY WITH FOOD AND WATER.   ketoconazole (NIZORAL) 2 % external shampoo   No No   Sig: Twice weekly for 4 weeks then weekly.   lamoTRIgine (LAMICTAL) 150 MG tablet 7/21/2025  Yes Yes   lamoTRIgine (LAMICTAL) 200 MG tablet   Yes No   Sig: Take 1 tablet by mouth daily at 2 pm.   levonorgestrel (MIRENA, 52 MG,) 52 MG (20 mcg/day) IUD   Yes No   Sig: Mirena 20 mcg/24 hours (8 yrs) 52 mg intrauterine device   Take 1 device by intrauterine route for 1825 days.   levonorgestrel-ethinyl estradiol (AVIANE) 0.1-20 MG-MCG tablet Unknown  No No   Sig: Take 1 tablet by mouth daily   levonorgestrel-ethinyl estradiol (AVIANE) 0.1-20 MG-MCG tablet   No No   Sig: Take 1 tablet by mouth daily. To use in a continuous fashion, skip placebo week pills   minoxidil (ROGAINE) 2 % external solution   No No   Sig: Apply topically 2 times daily   risperiDONE (RISPERDAL) 1 MG tablet 7/21/2025  Yes Yes   Sig: Take 1 tablet (1 mg) by mouth 2 times daily   spironolactone (ALDACTONE) 25 MG tablet 7/22/2025 Morning  Yes Yes   Sig: TAKE ONE TABLET BY MOUTH IN THE MORNING AND ONE IN THE EVENING. TAKE WITH A FULL GLASS OF WATER.   tacrolimus (PROTOPIC) 0.1 % external ointment   Yes No   Sig:  "APPLY THIN LAYER TO AFFECTED SKIN OF UPPER AND LOWER LIP TWICE DAILY.   tretinoin (RETIN-A) 0.05 % external cream   No No   Sig: Apply topically At Bedtime   venlafaxine (EFFEXOR-XR) 150 MG 24 hr capsule 7/21/2025  Yes Yes      Facility-Administered Medications: None      Patient Active Problem List   Diagnosis    Mixed anxiety and depressive disorder    Prediabetes    Chronic pain of left knee    Chronic right hip pain    Right-sided low back pain without sciatica      Past Medical History:   Diagnosis Date    ADHD (attention deficit hyperactivity disorder)     Choledocholithiasis 07/05/2014    Cholelithiasis with obstruction 07/04/2014    Gallstones     status post cholecystectomy 2014    Patellar dislocation, left, initial encounter       Past Surgical History:   Procedure Laterality Date    LAPAROSCOPIC CHOLECYSTECTOMY  07/05/2014    Procedure: LAPAROSCOPIC CHOLECYSTECTOMY;  Surgeon: Pako Sherman MD;  Location:  OR      Social History     Tobacco Use    Smoking status: Former    Smokeless tobacco: Never    Tobacco comments:     quit 3 years ago   Substance Use Topics    Alcohol use: Not Currently     Comment: social      Family History   Problem Relation Age of Onset    Cancer Mother     Cancer Maternal Grandfather     Cancer Paternal Grandfather         skin cancer      PHYSICAL EXAM:   LMP  (LMP Unknown)  Estimated body mass index is 36.32 kg/m  as calculated from the following:    Height as of 6/10/25: 1.676 m (5' 6\").    Weight as of 6/10/25: 102.1 kg (225 lb).   RESP: lungs clear to auscultation - no rales, rhonchi or wheezes   CV: regular rates and rhythm   ASA Class 3 - Severe systemic disease, but not incapacitating    Assessment: 44 y/o woman for screening colonoscopy    Plan: Colonoscopy with moderate sedation.  Risks of the procedure were discussed including, but not limited to, bleeding, perforation and missed lesions.  Patient understands and is willing to proceed.    Jared Mckeon MD " ....................  7/22/2025   8:50 AM  [facility] Staff  [phone]

## 2025-07-24 LAB
PATH REPORT.COMMENTS IMP SPEC: NORMAL
PATH REPORT.FINAL DX SPEC: NORMAL
PATH REPORT.GROSS SPEC: NORMAL
PATH REPORT.MICROSCOPIC SPEC OTHER STN: NORMAL
PATH REPORT.RELEVANT HX SPEC: NORMAL
PHOTO IMAGE: NORMAL

## 2025-07-28 PROBLEM — D12.6 TUBULAR ADENOMA OF COLON: Status: ACTIVE | Noted: 2025-07-28

## 2025-07-29 ENCOUNTER — PATIENT OUTREACH (OUTPATIENT)
Dept: GASTROENTEROLOGY | Facility: CLINIC | Age: 45
End: 2025-07-29

## 2025-07-29 ENCOUNTER — LAB (OUTPATIENT)
Dept: LAB | Facility: CLINIC | Age: 45
End: 2025-07-29
Attending: PHYSICIAN ASSISTANT
Payer: COMMERCIAL

## 2025-07-29 DIAGNOSIS — R79.89 ELEVATED TSH: ICD-10-CM

## 2025-07-29 LAB — TSH SERPL DL<=0.005 MIU/L-ACNC: 3.35 UIU/ML (ref 0.3–4.2)

## 2025-07-29 PROCEDURE — 84443 ASSAY THYROID STIM HORMONE: CPT

## 2025-07-29 PROCEDURE — 36415 COLL VENOUS BLD VENIPUNCTURE: CPT

## (undated) DEVICE — SOL WATER IRRIG 1000ML BOTTLE 2F7114

## (undated) DEVICE — BAG DECANTER STERILE WHITE DYNJDEC09

## (undated) DEVICE — CLIP HEMOSTASIS ASSURANCE W16 MM BX00711884

## (undated) RX ORDER — FENTANYL CITRATE 50 UG/ML
INJECTION, SOLUTION INTRAMUSCULAR; INTRAVENOUS
Status: DISPENSED
Start: 2025-07-22